# Patient Record
Sex: MALE | Race: WHITE
[De-identification: names, ages, dates, MRNs, and addresses within clinical notes are randomized per-mention and may not be internally consistent; named-entity substitution may affect disease eponyms.]

---

## 2019-02-23 ENCOUNTER — HOSPITAL ENCOUNTER (INPATIENT)
Dept: HOSPITAL 72 - EMR | Age: 59
LOS: 9 days | Discharge: LEFT BEFORE DISCHARGE | DRG: 689 | End: 2019-03-04
Payer: MEDICARE

## 2019-02-23 VITALS — DIASTOLIC BLOOD PRESSURE: 70 MMHG | SYSTOLIC BLOOD PRESSURE: 128 MMHG

## 2019-02-23 VITALS — HEIGHT: 72 IN | BODY MASS INDEX: 25.9 KG/M2 | WEIGHT: 191.25 LBS

## 2019-02-23 VITALS — DIASTOLIC BLOOD PRESSURE: 73 MMHG | SYSTOLIC BLOOD PRESSURE: 135 MMHG

## 2019-02-23 DIAGNOSIS — F29: ICD-10-CM

## 2019-02-23 DIAGNOSIS — E87.6: ICD-10-CM

## 2019-02-23 DIAGNOSIS — N39.0: Primary | ICD-10-CM

## 2019-02-23 DIAGNOSIS — G93.41: ICD-10-CM

## 2019-02-23 DIAGNOSIS — Z79.4: ICD-10-CM

## 2019-02-23 DIAGNOSIS — F20.0: ICD-10-CM

## 2019-02-23 DIAGNOSIS — I10: ICD-10-CM

## 2019-02-23 DIAGNOSIS — F32.9: ICD-10-CM

## 2019-02-23 DIAGNOSIS — E11.9: ICD-10-CM

## 2019-02-23 DIAGNOSIS — J44.9: ICD-10-CM

## 2019-02-23 LAB
ADD MANUAL DIFF: NO
ALBUMIN SERPL-MCNC: 3.3 G/DL (ref 3.4–5)
ALBUMIN/GLOB SERPL: 0.8 {RATIO} (ref 1–2.7)
ALP SERPL-CCNC: 127 U/L (ref 46–116)
ALT SERPL-CCNC: 22 U/L (ref 12–78)
ANION GAP SERPL CALC-SCNC: 8 MMOL/L (ref 5–15)
APPEARANCE UR: CLEAR
APTT PPP: YELLOW S
AST SERPL-CCNC: 23 U/L (ref 15–37)
BASOPHILS NFR BLD AUTO: 1.3 % (ref 0–2)
BILIRUB SERPL-MCNC: 0.6 MG/DL (ref 0.2–1)
BUN SERPL-MCNC: 7 MG/DL (ref 7–18)
CALCIUM SERPL-MCNC: 8.9 MG/DL (ref 8.5–10.1)
CHLORIDE SERPL-SCNC: 102 MMOL/L (ref 98–107)
CK MB SERPL-MCNC: 1.7 NG/ML (ref 0–3.6)
CK SERPL-CCNC: 276 U/L (ref 26–308)
CO2 SERPL-SCNC: 26 MMOL/L (ref 21–32)
CREAT SERPL-MCNC: 0.7 MG/DL (ref 0.55–1.3)
EOSINOPHIL NFR BLD AUTO: 1.1 % (ref 0–3)
ERYTHROCYTE [DISTWIDTH] IN BLOOD BY AUTOMATED COUNT: 11.8 % (ref 11.6–14.8)
GLOBULIN SER-MCNC: 3.9 G/DL
GLUCOSE UR STRIP-MCNC: NEGATIVE MG/DL
HCT VFR BLD CALC: 40 % (ref 42–52)
HGB BLD-MCNC: 13.7 G/DL (ref 14.2–18)
KETONES UR QL STRIP: NEGATIVE
LEUKOCYTE ESTERASE UR QL STRIP: (no result)
LYMPHOCYTES NFR BLD AUTO: 33.8 % (ref 20–45)
MCV RBC AUTO: 93 FL (ref 80–99)
MONOCYTES NFR BLD AUTO: 8.1 % (ref 1–10)
NEUTROPHILS NFR BLD AUTO: 55.8 % (ref 45–75)
NITRITE UR QL STRIP: NEGATIVE
PH UR STRIP: 7 [PH] (ref 4.5–8)
PLATELET # BLD: 306 K/UL (ref 150–450)
POTASSIUM SERPL-SCNC: 3.3 MMOL/L (ref 3.5–5.1)
PROT UR QL STRIP: (no result)
RBC # BLD AUTO: 4.3 M/UL (ref 4.7–6.1)
SODIUM SERPL-SCNC: 136 MMOL/L (ref 136–145)
SP GR UR STRIP: 1.01 (ref 1–1.03)
UROBILINOGEN UR-MCNC: 1 MG/DL (ref 0–1)
WBC # BLD AUTO: 9.7 K/UL (ref 4.8–10.8)

## 2019-02-23 PROCEDURE — 84484 ASSAY OF TROPONIN QUANT: CPT

## 2019-02-23 PROCEDURE — 80048 BASIC METABOLIC PNL TOTAL CA: CPT

## 2019-02-23 PROCEDURE — 87081 CULTURE SCREEN ONLY: CPT

## 2019-02-23 PROCEDURE — 99285 EMERGENCY DEPT VISIT HI MDM: CPT

## 2019-02-23 PROCEDURE — 82553 CREATINE MB FRACTION: CPT

## 2019-02-23 PROCEDURE — 82962 GLUCOSE BLOOD TEST: CPT

## 2019-02-23 PROCEDURE — 82550 ASSAY OF CK (CPK): CPT

## 2019-02-23 PROCEDURE — 83690 ASSAY OF LIPASE: CPT

## 2019-02-23 PROCEDURE — 96372 THER/PROPH/DIAG INJ SC/IM: CPT

## 2019-02-23 PROCEDURE — 81003 URINALYSIS AUTO W/O SCOPE: CPT

## 2019-02-23 PROCEDURE — 85025 COMPLETE CBC W/AUTO DIFF WBC: CPT

## 2019-02-23 PROCEDURE — 36415 COLL VENOUS BLD VENIPUNCTURE: CPT

## 2019-02-23 PROCEDURE — 96360 HYDRATION IV INFUSION INIT: CPT

## 2019-02-23 PROCEDURE — 93005 ELECTROCARDIOGRAM TRACING: CPT

## 2019-02-23 PROCEDURE — 80053 COMPREHEN METABOLIC PANEL: CPT

## 2019-02-23 PROCEDURE — 71045 X-RAY EXAM CHEST 1 VIEW: CPT

## 2019-02-23 PROCEDURE — 87086 URINE CULTURE/COLONY COUNT: CPT

## 2019-02-23 PROCEDURE — 70450 CT HEAD/BRAIN W/O DYE: CPT

## 2019-02-23 PROCEDURE — 83880 ASSAY OF NATRIURETIC PEPTIDE: CPT

## 2019-02-23 PROCEDURE — 70486 CT MAXILLOFACIAL W/O DYE: CPT

## 2019-02-23 PROCEDURE — 87181 SC STD AGAR DILUTION PER AGT: CPT

## 2019-02-23 RX ADMIN — HEPARIN SODIUM SCH UNITS: 5000 INJECTION INTRAVENOUS; SUBCUTANEOUS at 21:00

## 2019-02-23 RX ADMIN — INSULIN ASPART SCH UNITS: 100 INJECTION, SOLUTION INTRAVENOUS; SUBCUTANEOUS at 21:00

## 2019-02-23 NOTE — NUR
NURSE NOTES:

Left message to Dr. Younger regarding admission orders as asked to by Dr. Lakhani. Awaiting 
orders.

## 2019-02-23 NOTE — NUR
NURSE NOTES:

Patient not allowing to do physical assessment  ,will not answer questiions,  patient state 
leave me alone I just want to sleep Respirations unlabored. Waiting for admission 
orders.Call light within reach,bed alarm is on.

## 2019-02-23 NOTE — NUR
NURSE NOTES:

Notified Dr. Lakhani of admission and admission orders. Medications reconciled. Patient 
refusing vital signs, yelling at staff to "GET OUT, I WANT TO SLEEP". Two RNs attempted to 
take vitals and do assessment. Information for admission taken from SNF paperwork.

## 2019-02-23 NOTE — NUR
TRANSFER TO FLOOR:

Patient transferred to Med Surg as ordered, per Campos Lakhani.   Report given to 
Charge Nurse.

## 2019-02-23 NOTE — EMERGENCY ROOM REPORT
History of Present Illness


General


Chief Complaint:  Altered Level of Consciousness


Source:  Patient, EMS





Present Illness


HPI


Patient presents with complaints of change in mental status patient appears to 

be more confused than usual


Patient has history of underlying psychiatric conditions


Has obvious facial trauma





Upon initial discussion and history the patient is screaming at staff and myself


Telling us to back off and to not touch him





Patient has lack of decision-making capacity is not aware of his medical 

condition and is not reporting any facial trauma which she clearly has





History of present illness remains significantly limited


Allergies:  


Coded Allergies:  


     No Known Allergies (Unverified , 2/23/19)





Patient History


Limited by:  medical condition


Past Medical History:  see triage record


Pertinent Family History:  unable to obtain


Reviewed Nursing Documentation:  PMH: Agreed; PSxH: Agreed





Review of Systems


All Other Systems:  limited - Other than the ones mentioned in the history of 

present illness all others are reviewed however they do stay limited due to the 

patient's mental status





Physical Exam





Vital Signs








  Date Time  Temp Pulse Resp B/P (MAP) Pulse Ox O2 Delivery O2 Flow Rate FiO2


 


2/23/19 14:33 98.1 88 19 128/70 99 Room Air  








Sp02 EP Interpretation:  reviewed, normal


General Appearance:  no apparent distress


Head:  other - Ecchymosis around the left upper and lower orbital region, some 

edema bilaterally as well both eyelids


ENT:  normal pharynx, no angioedema


Neck:  supple, thyroid normal


Respiratory:  lungs clear, no retraction, no accessory muscle use


Cardiovascular #1:  regular rate, rhythm


Gastrointestinal:  non tender, soft


Musculoskeletal:  normal inspection, other - Patient does not follow commands 

specifically however does not show any evidence of focal deficit


Neurologic:  responsive, other - However patient does have history of 

psychiatric conditions appears to have acute psychosis


Skin:  normal color, no rash


Lymphatic:  no adenopathy





Medical Decision Making


Diagnostic Impression:  


 Primary Impression:  


 Encephalopathy


 Additional Impressions:  


 Schizophrenia


 Allergic reaction


ER Course


Patient is a fairly complex patient with multiple differential to consideration 

including but not limited to cardiac cardiopulmonary and vascular emergencies


Given the increased edema in both upper eyelids as well


Some consideration for reaction is noted





Patient's blood work otherwise appropriate CT imaging does not show any acute 

intracranial pathology


And patient stable for initial conservative inpatient trial





Labs








Test


  2/23/19


15:30 2/23/19


15:39


 


Urine Color Yellow  


 


Urine Appearance Clear  


 


Urine pH 7 (4.5-8.0)  


 


Urine Specific Gravity


  1.010


(1.005-1.035) 


 


 


Urine Protein 1+ (NEGATIVE)  


 


Urine Glucose (UA)


  Negative


(NEGATIVE) 


 


 


Urine Ketones


  Negative


(NEGATIVE) 


 


 


Urine Blood


  Negative


(NEGATIVE) 


 


 


Urine Nitrite


  Negative


(NEGATIVE) 


 


 


Urine Bilirubin


  Negative


(NEGATIVE) 


 


 


Urine Urobilinogen


  1 MG/DL


(0.0-1.0) 


 


 


Urine Leukocyte Esterase 1+ (NEGATIVE)  


 


Urine RBC


  2-4 /HPF (0 -


0) 


 


 


Urine WBC


  5-10 /HPF (0 -


0) 


 


 


Urine Squamous Epithelial


Cells None /LPF


(NONE/OCC) 


 


 


Urine Amorphous Sediment


  Few /LPF


(NONE) 


 


 


Urine Bacteria


  Moderate /HPF


(NONE) 


 


 


White Blood Count


  


  9.7 K/UL


(4.8-10.8)


 


Red Blood Count


  


  4.30 M/UL


(4.70-6.10)


 


Hemoglobin


  


  13.7 G/DL


(14.2-18.0)


 


Hematocrit


  


  40.0 %


(42.0-52.0)


 


Mean Corpuscular Volume  93 FL (80-99) 


 


Mean Corpuscular Hemoglobin


  


  31.8 PG


(27.0-31.0)


 


Mean Corpuscular Hemoglobin


Concent 


  34.2 G/DL


(32.0-36.0)


 


Red Cell Distribution Width


  


  11.8 %


(11.6-14.8)


 


Platelet Count


  


  306 K/UL


(150-450)


 


Mean Platelet Volume


  


  9.1 FL


(6.5-10.1)


 


Neutrophils (%) (Auto)


  


  55.8 %


(45.0-75.0)


 


Lymphocytes (%) (Auto)


  


  33.8 %


(20.0-45.0)


 


Monocytes (%) (Auto)


  


  8.1 %


(1.0-10.0)


 


Eosinophils (%) (Auto)


  


  1.1 %


(0.0-3.0)


 


Basophils (%) (Auto)


  


  1.3 %


(0.0-2.0)


 


Sodium Level


  


  136 MMOL/L


(136-145)


 


Potassium Level


  


  3.3 MMOL/L


(3.5-5.1)


 


Chloride Level


  


  102 MMOL/L


()


 


Carbon Dioxide Level


  


  26 MMOL/L


(21-32)


 


Anion Gap


  


  8 mmol/L


(5-15)


 


Blood Urea Nitrogen  7 mg/dL (7-18) 


 


Creatinine


  


  0.7 MG/DL


(0.55-1.30)


 


Estimat Glomerular Filtration


Rate 


  > 60 mL/min


(>60)


 


Glucose Level


  


  118 MG/DL


()


 


Calcium Level


  


  8.9 MG/DL


(8.5-10.1)


 


Total Bilirubin


  


  0.6 MG/DL


(0.2-1.0)


 


Aspartate Amino Transf


(AST/SGOT) 


  23 U/L (15-37) 


 


 


Alanine Aminotransferase


(ALT/SGPT) 


  22 U/L (12-78) 


 


 


Alkaline Phosphatase


  


  127 U/L


()


 


Total Creatine Kinase


  


  276 U/L


()


 


Creatine Kinase MB


  


  1.7 NG/ML


(0.0-3.6)


 


Creatine Kinase MB Relative


Index 


  0.6 


 


 


Troponin I


  


  0.007 ng/mL


(0.000-0.056)


 


Pro-B-Type Natriuretic Peptide


  


  135 pg/mL


(0-125)


 


Total Protein


  


  7.2 G/DL


(6.4-8.2)


 


Albumin


  


  3.3 G/DL


(3.4-5.0)


 


Globulin  3.9 g/dL 


 


Albumin/Globulin Ratio  0.8 (1.0-2.7) 


 


Lipase


  


  64 U/L


()








Rhythm Strip Diag. Results


EP Interpretation:  yes


Rate:  60


Rhythm:  NSR, no PVC's, no ectopy





Chest X-Ray Diagnostic Results


Chest X-Ray Diagnostic Results :  


   Chest X-Ray Ordered:  Yes


   # of Views/Limited/Complete:  1 View


   Indication:  Chest Pain


   EP Interpretation:  Yes


   Interpretation:  no consolidation, no pneumothorax, other - Poor respiratory 

efforts mild atelectasis bilaterally,


   Impression:  No acute disease


   Electronically Signed by:  Abril Reagan DO





CT/MRI/US Diagnostic Results


CT/MRI/US Diagnostic Results :  


   Impression


CT head1.  No acute intracranial findings.


2.  Soft tissue swelling in the frontal scalp with possible subgaleal 


hematoma.


3.  Partial opacification of the left mastoid air cells, suggesting left 


mastoid effusion.








CT facialIMPRESSION:     


  Partial fluid opacification of the left mastoid air cells, suggesting 


the left mastoid effusion.





Last Vital Signs








  Date Time  Temp Pulse Resp B/P (MAP) Pulse Ox O2 Delivery O2 Flow Rate FiO2


 


2/23/19 14:33 98.1 88 19 128/70 99 Room Air  








Status:  improved


Disposition:  ADMITTED AS INPATIENT


Condition:  Abril Obando DO Feb 23, 2019 15:07

## 2019-02-23 NOTE — DIAGNOSTIC IMAGING REPORT
EXAM:

  CT Maxillofacial Without Intravenous Contrast

 

CLINICAL HISTORY:

  Altered mental status

 

TECHNIQUE:

  Axial computed tomography images of the face without intravenous 

contrast.  CTDI is 28.19 mGy and DLP is 621.62 mGy-cm.  One or more of 

the following dose reduction techniques were used: automated exposure 

control, adjustment of the mA and/or kV according to patient size, use of 

iterative reconstruction technique.

  Coronal and sagittal reformatted images were created and reviewed.

 

COMPARISON:

  Noncontrast CT of the head obtained the same date

 

FINDINGS:

  Bones/joints:  No visible displaced fracture in the facial bones, nasal 

bones, maxilla or mandible.  Nasal septum is midline.

  Soft tissues:  Unremarkable.

  Orbits:  Unremarkable. Bony orbits appear intact.  Bilateral globes and 

intraconal soft tissues appear unremarkable.

  Sinuses:  Unremarkable.  No air-fluid levels.

  Mastoid air cells:  Partial fluid opacification of the left mastoid air 

cells, suggesting the left mastoid effusion.

 

IMPRESSION:     

  Partial fluid opacification of the left mastoid air cells, suggesting 

the left mastoid effusion.

## 2019-02-23 NOTE — DIAGNOSTIC IMAGING REPORT
EXAM:

  XR Chest, 1 View

 

CLINICAL HISTORY:

  Chest pain

 

TECHNIQUE:

  Frontal view of the chest.

 

COMPARISON:

  No relevant prior studies available.

 

FINDINGS:

  Lungs:  Low lung volumes, which may be related to shallow inspiration.  

Sub-segmental atelectasis in the lung bases, left greater than right.  

Mildly increased interstitial markings.  Oblique linear marking overlying 

the right upper lung, mediastinum, left lower lung, and left upper 

abdominal quadrant, likely represents a skinfold.

  Pleural space:  Unremarkable.  The costophrenic angles are sharp.  No 

visible pneumothorax.

  Heart:  Unremarkable.  No cardiomegaly.

  Mediastinum: Unremarkable.

  Bones/joints:  Mild degenerative changes at the visualized spine.

  Tubes, lines and devices:  EKG patch overlies the left upper thorax.

 

IMPRESSION:     

1.  Low lung volumes, which may be related to shallow inspiration.

2.  Sub-segmental atelectasis in the lung bases, left greater than right.

3.  Mildly increased interstitial markings.  This is likely related to 

bronchovascular crowding from the low lung volumes.  .

## 2019-02-23 NOTE — NUR
ED Nurse Note:

Patient kathy from [facility] c/o ALOC, EMs states that the patient's last 
known well time was yesterday. at time of arrival patient is awake, denies any 
pain. patient does have a bump on the right side of his face, when asked how 
that happened, patient stated that he fe fell off the bed. no lacerations noted

## 2019-02-23 NOTE — NUR
NURSE NOTES:

Pt is received from [facility]. Pt is in bed asleep. No acute distress noted. Pt refuses to 
engage with the nurse. Pt refuses physical assessment and vital sign check. Pt appears to 
have bruising and swelling around the right eye, scan was taken in the ER. Pt is resistive 
to care. Pt states, " leave me alone". Reorientation provided. Fall precaution implemented. 
Bed low in position, side rails up and call light with reach. Pt will be monitored.

## 2019-02-24 VITALS — SYSTOLIC BLOOD PRESSURE: 114 MMHG | DIASTOLIC BLOOD PRESSURE: 57 MMHG

## 2019-02-24 RX ADMIN — SERTRALINE HYDROCHLORIDE SCH MG: 100 TABLET, FILM COATED ORAL at 09:00

## 2019-02-24 RX ADMIN — HEPARIN SODIUM SCH UNITS: 5000 INJECTION INTRAVENOUS; SUBCUTANEOUS at 09:00

## 2019-02-24 RX ADMIN — METOPROLOL TARTRATE SCH MG: 25 TABLET, FILM COATED ORAL at 09:00

## 2019-02-24 RX ADMIN — INSULIN ASPART SCH UNITS: 100 INJECTION, SOLUTION INTRAVENOUS; SUBCUTANEOUS at 11:30

## 2019-02-24 RX ADMIN — INSULIN ASPART SCH UNITS: 100 INJECTION, SOLUTION INTRAVENOUS; SUBCUTANEOUS at 16:30

## 2019-02-24 RX ADMIN — INSULIN ASPART SCH UNITS: 100 INJECTION, SOLUTION INTRAVENOUS; SUBCUTANEOUS at 21:00

## 2019-02-24 RX ADMIN — HEPARIN SODIUM SCH UNITS: 5000 INJECTION INTRAVENOUS; SUBCUTANEOUS at 21:00

## 2019-02-24 RX ADMIN — INSULIN ASPART SCH UNITS: 100 INJECTION, SOLUTION INTRAVENOUS; SUBCUTANEOUS at 06:30

## 2019-02-24 NOTE — NUR
NURSE NOTES:

Pt is in bed, asleep. Pt does not  like to interact with nursing staff. Pt is not eating or 
drinking anything. Pt likes to be left alone. Dr. cui was notified by previous shift RN. 
Dr. Madrigal came to see patient per RN. Pt is on Fall Precaution. Pt will be monitored. Bed 
alarm on. Bed low in position,side rails up and call light within reach. 

-------------------------------------------------------------------------------

Addendum: 02/24/19 at 1931 by EVE COATS RN RN

-------------------------------------------------------------------------------

Disregard above note; wrong time.

## 2019-02-24 NOTE — CONSULTATION
History of Present Illness


General


Date patient seen:  Feb 24, 2019


Time patient seen:  08:30


Chief Complaint:  Altered Level of Consciousness


Referring physician:  dr Lakhani


Reason for Consultation:  inpatient manageemnt, acute encephalopathy, hx of





Present Illness


HPI


58 years old male with PMH of HTN, DM, COPD,   major depressive disorder ,  

schizophrenia , anxiety disorder , presented to emergency department for being 

more confused than usual.  





Upon evaluation  vital signs were stable.


Laboratory workup revealed no leukocytosis, stable hemoglobin and hematocrit, 

stable electrolytes, except potassium 3.3.


Glucose 118.


Stable renal parameters.


Troponin negative, pro . EKG revealed normal sinus rhythm, no acute 

ischemic changes.


Albumin 3.3.


Lipase 64.


Chest x-ray revealed no acute cardiopulmonary pathology, but showed bilateral 

mild atelectasis.


CT of the head revealed no acute intracranial pathology.  Noted soft tissue 

swelling in the frontal scalp with possible subgaleal hematoma.  Partial 

opacification of the left mastoid air cells suggesting left mastoid effusion.


In emergency department patient received empiric antibiotic and was given 1 

dose of Solu-Medrol for possible allergic reaction along with the Benadryl.


Patient received bolus of fluid,  Haldol and was  admitted to medical surgical 

floor for further management.


Allergies:  


Coded Allergies:  


     No Known Allergies (Unverified , 2/23/19)





Medication History


Scheduled


Ciprofloxacin (Ciprofloxacin HCl), 1 DROP BOTH EYES QID, (Reported)


Ferrous Sulfate* (Ferrous Sulfate*), 325 MG ORAL DAILY, (Reported)


Hydrochlorothiazide* (Hydrochlorothiazide*), 12.5 MG ORAL DAILY, (Reported)


Metoprolol Tartrate* (Metoprolol Tartrate*), 25 MG ORAL DAILY, (Reported)


Multivitamin With Minerals (Multivitamins With Minerals*), 1 TAB ORAL DAILY, (

Reported)


Quetiapine Fumarate* (Seroquel*), 100 MG ORAL TWICE A DAY, (Reported)


Sertraline Hcl* (Zoloft*), 100 MG ORAL DAILY, (Reported)





Scheduled PRN


Acetaminophen* (Acetaminophen 325MG Tablet*), 650 MG ORAL Q4H PRN for Mild Pain/

Temp > 100.5, (Reported)


Lorazepam* (Ativan*), 1 MG ORAL Q12HR PRN for For Anxiety, (Reported)


Magnesium Hydroxide (Milk of Magnesia), 30 ML ORAL EVERY 4 HOURS PRN for 

HEARTBURN/GASTRITIS, (Reported)





Miscellaneous Medications


Insulin Human Lispro (Humalog), 0 SUBQ, (Reported)





Discontinued Medications


Acetaminophen* (Acetaminophen 325MG Tablet*), 325 MG ORAL Q4H PRN for Mild Pain/

Temp > 100.5, (Reported)


   Discontinued Reason: Medication dose changed


Insulin Lispro (Humalog), 0 SUBQ, (Reported)


   Discontinued Reason: Medication dose changed


Lorazepam* (Ativan*), 1 MG ORAL BEDTIME PRN for RECURRENT FEELINGS OF PANIC, (

Reported)


   Discontinued Reason: Medication dose changed


Sertraline Hcl* (Zoloft*), 100 MG ORAL BID, (Reported)


   Discontinued Reason: Medication dose changed





Patient History


History Provided By:  Medical Record


Healthcare decision maker


N


Resuscitation status


Full Code


Advanced Directive on File








Past Medical/Surgical History


Past Medical/Surgical History:  


(1) Schizophrenia


(2) Anxiety





Review of Systems


ROS Narrative


not available, patient is unable to provide it





Physical Exam


General Appearance:  no apparent distress - awake, alert, depressed with flat 

affect male


Lines, tubes and drains:  peripheral


HEENT:  normocephalic, atraumatic, anicteric, mucous membranes moist, other - L 

eyelid ecchymosis and edema


Neck:  non-tender, supple


Respiratory/Chest:  lungs clear, no accessory muscle use


Cardiovascular/Chest:  normal rate, no JVD


Abdomen:  normal bowel sounds, non tender, soft


Extremities:  normal range of motion, no calf tenderness


Neurologic:  alert, responsive - flat affect , other - no gross focal


Musculoskeletal:  normal muscle bulk





Last 24 Hour Vital Signs








  Date Time  Temp Pulse Resp B/P (MAP) Pulse Ox O2 Delivery O2 Flow Rate FiO2


 


2/23/19 21:00      Room Air  


 


2/23/19 17:20 98.1 85 22 128/70 99 Room Air  


 


2/23/19 16:21 98.1 85 22 135/73 99 Room Air  


 


2/23/19 14:50 98.1 85 19 128/70 99 Room Air  


 


2/23/19 14:50  88 19   Room Air  


 


2/23/19 14:33 98.1 88 19 128/70 99 Room Air  

















Intake and Output  


 


 2/23/19 2/24/19





 18:59 06:59


 


Intake Total 0 ml 


 


Balance 0 ml 


 


  


 


Intake Oral 0 ml 











Laboratory Tests








Test


  2/23/19


15:30 2/23/19


15:39


 


Urine Color Yellow   


 


Urine Appearance Clear   


 


Urine pH 7 (4.5-8.0)   


 


Urine Specific Gravity


  1.010


(1.005-1.035) 


 


 


Urine Protein


  1+ (NEGATIVE)


H 


 


 


Urine Glucose (UA)


  Negative


(NEGATIVE) 


 


 


Urine Ketones


  Negative


(NEGATIVE) 


 


 


Urine Blood


  Negative


(NEGATIVE) 


 


 


Urine Nitrite


  Negative


(NEGATIVE) 


 


 


Urine Bilirubin


  Negative


(NEGATIVE) 


 


 


Urine Urobilinogen


  1 MG/DL


(0.0-1.0)  H 


 


 


Urine Leukocyte Esterase


  1+ (NEGATIVE)


H 


 


 


Urine RBC


  2-4 /HPF (0 -


0)  H 


 


 


Urine WBC


  5-10 /HPF (0 -


0)  H 


 


 


Urine Squamous Epithelial


Cells None /LPF


(NONE/OCC) 


 


 


Urine Amorphous Sediment


  Few /LPF


(NONE)  H 


 


 


Urine Bacteria


  Moderate /HPF


(NONE)  H 


 


 


White Blood Count


  


  9.7 K/UL


(4.8-10.8)


 


Red Blood Count


  


  4.30 M/UL


(4.70-6.10)  L


 


Hemoglobin


  


  13.7 G/DL


(14.2-18.0)  L


 


Hematocrit


  


  40.0 %


(42.0-52.0)  L


 


Mean Corpuscular Volume  93 FL (80-99)  


 


Mean Corpuscular Hemoglobin


  


  31.8 PG


(27.0-31.0)  H


 


Mean Corpuscular Hemoglobin


Concent 


  34.2 G/DL


(32.0-36.0)


 


Red Cell Distribution Width


  


  11.8 %


(11.6-14.8)


 


Platelet Count


  


  306 K/UL


(150-450)


 


Mean Platelet Volume


  


  9.1 FL


(6.5-10.1)


 


Neutrophils (%) (Auto)


  


  55.8 %


(45.0-75.0)


 


Lymphocytes (%) (Auto)


  


  33.8 %


(20.0-45.0)


 


Monocytes (%) (Auto)


  


  8.1 %


(1.0-10.0)


 


Eosinophils (%) (Auto)


  


  1.1 %


(0.0-3.0)


 


Basophils (%) (Auto)


  


  1.3 %


(0.0-2.0)


 


Sodium Level


  


  136 MMOL/L


(136-145)


 


Potassium Level


  


  3.3 MMOL/L


(3.5-5.1)  L


 


Chloride Level


  


  102 MMOL/L


()


 


Carbon Dioxide Level


  


  26 MMOL/L


(21-32)


 


Anion Gap


  


  8 mmol/L


(5-15)


 


Blood Urea Nitrogen


  


  7 mg/dL (7-18)


 


 


Creatinine


  


  0.7 MG/DL


(0.55-1.30)


 


Estimat Glomerular Filtration


Rate 


  > 60 mL/min


(>60)


 


Glucose Level


  


  118 MG/DL


()  H


 


Calcium Level


  


  8.9 MG/DL


(8.5-10.1)


 


Total Bilirubin


  


  0.6 MG/DL


(0.2-1.0)


 


Aspartate Amino Transf


(AST/SGOT) 


  23 U/L (15-37)


 


 


Alanine Aminotransferase


(ALT/SGPT) 


  22 U/L (12-78)


 


 


Alkaline Phosphatase


  


  127 U/L


()  H


 


Total Creatine Kinase


  


  276 U/L


()


 


Creatine Kinase MB


  


  1.7 NG/ML


(0.0-3.6)


 


Creatine Kinase MB Relative


Index 


  0.6  


 


 


Troponin I


  


  0.007 ng/mL


(0.000-0.056)


 


Pro-B-Type Natriuretic Peptide


  


  135 pg/mL


(0-125)  H


 


Total Protein


  


  7.2 G/DL


(6.4-8.2)


 


Albumin


  


  3.3 G/DL


(3.4-5.0)  L


 


Globulin  3.9 g/dL  


 


Albumin/Globulin Ratio


  


  0.8 (1.0-2.7)


L


 


Lipase


  


  64 U/L


()  L











Microbiology








 Date/Time


Source Procedure


Growth Status


 


 


 2/23/19 15:30


Urine,Clean Catch Urine Culture - Preliminary Resulted








Height (Feet):  6


Height (Inches):  0.00


Weight (Pounds):  192


Medications





Current Medications








 Medications


  (Trade)  Dose


 Ordered  Sig/Petr


 Route


 PRN Reason  Start Time


 Stop Time Status Last Admin


Dose Admin


 


 Acetaminophen


  (Tylenol)  650 mg  Q4H  PRN


 ORAL


 fever  2/23/19 19:00


 3/25/19 18:59   


 


 


 Al Hydroxide/Mg


 Hydroxide


  (Mylanta II)  30 ml  Q6H  PRN


 ORAL


 dyspepsia  2/23/19 19:00


 3/25/19 18:59   


 


 


 Dextrose


  (Dextrose 50%)  25 ml  Q30M  PRN


 IV


 Hypoglycemia  2/23/19 19:00


 3/25/19 18:59   


 


 


 Dextrose


  (Dextrose 50%)  50 ml  Q30M  PRN


 IV


 Hypoglycemia  2/23/19 19:00


 3/25/19 18:59   


 


 


 Ferrous Sulfate


  (Feosol)  325 mg  DAILY


 ORAL


   2/24/19 09:00


 3/26/19 08:59   


 


 


 Heparin Sodium


  (Porcine)


  (Heparin 5000


 units/ml)  5,000 units  EVERY 12  HOURS


 SUBQ


   2/23/19 21:00


 3/25/19 20:59   


 


 


 Insulin Aspart


  (NovoLOG)    BEFORE MEALS AND  HS


 SUBQ


   2/23/19 21:00


 3/25/19 20:59   


 


 


 Lorazepam


  (Ativan 2mg/ml


 1ml)  0.5 mg  Q4H  PRN


 IV


 For Anxiety  2/23/19 19:00


 3/2/19 18:59   


 


 


 Lorazepam


  (Ativan 2mg/ml


 1ml)  2 mg  Q4H  PRN


 IV


 For Anxiety  2/23/19 19:00


 3/2/19 18:59   


 


 


 Metoprolol


 Tartrate


  (Lopressor)  25 mg  DAILY


 ORAL


   2/24/19 09:00


 3/26/19 08:59   


 


 


 Morphine Sulfate


  (Morphine


 Sulfate)  1 mg  Q4H  PRN


 IVP


 For Pain  2/23/19 19:00


 3/2/19 18:59   


 


 


 Ondansetron HCl


  (Zofran)  4 mg  Q6H  PRN


 IVP


 Nausea & Vomiting  2/23/19 19:00


 3/25/19 18:59   


 


 


 Polyethylene


 Glycol


  (Miralax)  17 gm  HSPRN  PRN


 ORAL


 Constipation  2/23/19 19:00


 3/25/19 18:59   


 


 


 Quetiapine


 Fumarate


  (SEROquel)  100 mg  Q12HR


 ORAL


   2/23/19 21:00


 3/25/19 20:59   


 


 


 Sertraline HCl


  (Zoloft)  100 mg  DAILY


 ORAL


   2/24/19 09:00


 3/26/19 08:59   


 


 


 Zolpidem Tartrate


  (Ambien)  5 mg  HSPRN  PRN


 ORAL


 Insomnia  2/23/19 19:00


 3/2/19 18:59   


 











Assessment/Plan


Assessment/Plan


ASSESSMENT


acute encephalopathy


probably UTI  


COPD 


HTN 


DM 


Hypokalemia  


Major depressive disorder   





PLAN OF CARE 


MS floor 


empiric abx, fup with urine cx  


ID consult


s/p IVF, monitor renal parameters,  lytes, correct lytes as needed


O2,  HHN prn 


CXR stable 


doubt allergic reaction 





CT head no acute IC pathology, 


psych eval pending


probably exacerbation of psych problems 


BP management  with BB 


BS management with SSI


resume current psych meds for now with further optimization per psych MD when 

see the pt 


supportive care


DVT prophylaxis  


bowel regimen   





case discussed and evaluated by supervising physician











Paulina Everett NP Feb 24, 2019 09:55

## 2019-02-24 NOTE — NUR
NURSE NOTES:

 Patient responds to name,still does not want to eat breakfast or take po medication as 
ordered. After several attempts to get patient to cooperate,patient state will you leave me 
alone.

## 2019-02-24 NOTE — NUR
CASE MANAGEMENT: REVIEW



58/M BIBA FROM [facility] 



CC: AMS





SI: ENCEPHALOPATHY . ALOC

T 98.1 HR 88 RR 19 /70 SAT 99% ROOM AIR

H/H 13.7/40.0 K 3.3 

UA: LEUKOCYTE 1+ WBC 5-10 BACTERIA MODERATE





IS: NS IVF BOLUS X1

HALDOL IM X1

BENADRYL IV X1

SOLU MEDROL IV X1

CEFTRIAXONE IV X1







***PATIENT ADMITTED TO MED/SURG UNIT 02/23/2019***

DCP: PATIENT IS FROM [facility]

## 2019-02-24 NOTE — NUR
NURSE NOTES:

 Patient continues to be resistive to care,refusing vital sign,patient  state I don't 
want,leave me alone.. Offer patient urinal ,patient   state   I dont't  have to go..Will 
continue to monitor.

## 2019-02-24 NOTE — NUR
NURSE NOTES:

Pt continues to resist care. Pt is sleeping in bed. Pt refuses blood sugar check, food, 
vital signs check. Pt just wants to be left alone. Pt has been quite so far. Pt is on fall 
risk precaution. Pt will be monitored.

## 2019-02-24 NOTE — NUR
NURSE NOTES:

DR cui notified and given update regarding patient refusal of care and patient will not 
eat or drink.patient just wants to be left alone  patient is quiet and will only get 
agitated  if we attempt care otherwise patient is quiet and calm.DR Cui state to notify DR Madrigal and order to tranfer patient to [facility] prn,will notify  DR Madrigal .

## 2019-02-24 NOTE — NUR
NURSE NOTES:

Pt is in bed, asleep. Pt does not  like to interact with nursing staff. Pt is not eating or 
drinking anything. Pt likes to be left alone. Dr. cui was notified by previous shift RN. 
Dr. Madrigal came to see patient per RN. Pt is on Fall Precaution. Pt will be monitored. Bed 
alarm on. Bed low in position,side rails up and call light within reach.

## 2019-02-24 NOTE — NUR
NURSE NOTES:

 Patient responds to name,breakfast at bed side,patient state he does not want to eat at 
this time.respirations unlabored.Call light within reach,bed alarm is on.

## 2019-02-24 NOTE — NUR
NURSE NOTES:

 Patient ambulated to the bathroom ,gait is steady,patient back to bed,continue to refuse 
care.

## 2019-02-24 NOTE — NUR
NURSE NOTES:patient still resistant to care refusing routine medication as ordered,still 
does not want to eat breakfast.

## 2019-02-24 NOTE — NUR
NURSE NOTES: 

Patient responds when name called,patient ate a small amount of his dinner.Resting,at this 
time,call light within reach,bed alarm is on.

## 2019-02-24 NOTE — HISTORY AND PHYSICAL REPORT
DATE OF ADMISSION:  02/23/2019

TIME SEEN:  On 02/24/2019 at approx time 7 a.m.



CONSULTANTS:

1. Mili Madrigal M.D.

2. Jimmy Osborn M.D.

3. Tao Younger M.D.



CHIEF COMPLAINT:  Confusion, psychosis, possible allergic reaction, and

left eye ecchymosis.



BRIEF HISTORY:  This is a 58-year-old male from [facility],

presented with the above-mentioned diagnoses, admitted to medical floor

for further treatment.  Currently, sleeping in bed, refusing to answer

questions.



REVIEW OF SYSTEMS:  Unavailable.



PAST MEDICAL HISTORY:  Includes schizophrenia, altered mental status,

encephalopathy, and anxiety.



PAST SURGICAL HISTORY:  Unknown.



ALLERGIES:  Denies.



SOCIAL HISTORY:  Unable to obtain secondary to the patient's condition.



OBJECTIVE:

GENERAL:  Slightly confused in bed, sleepy, refusing to answer questions.

VITAL SIGNS:  Temperature is 98 degrees, pulse 85, respiratory rate 22, and

blood pressure 120/70.

CARDIOVASCULAR:  No murmur.

LUNGS:  Poor exchange.

ABDOMEN:  Bowel sounds positive.

EXTREMITIES:  Show no cyanosis or edema.

HEENT:  Left periorbital ecchymosis noted.

NEUROLOGIC:  The patient moves all extremities, slightly weak.



LABORATORY DATA:  Hemoglobin 13.7.  Otherwise, CBC is normal.  BMP shows

potassium 3.3 and glucose 118.  Troponin 0.007.  BNP is 135, otherwise all

normal.  Urinalysis, 1+ leukocyte esterase.



MEDICATIONS:  Include ferrous sulfate, metoprolol, sertraline, quetiapine,

insulin, Tylenol, morphine, _____ hydroxide, ceftriaxone,

methylprednisone, diphenhydramine, and Haldol.



ASSESSMENT:

1. Psychosis.

2. Urinary tract infection.

3. Left eye ecchymosis.

4. Allergic reaction.



PLAN:

1. Antibiotic per Infectious Disease.

2. _____ and dietary followup.

3. Resume home medications.

4. CBC and BMP in the morning.









  ______________________________________________

  Campos Lakhani D.O.





DR:  UBALDO

D:  02/24/2019 07:41

T:  02/24/2019 08:12

JOB#:  [id number]

CC:

## 2019-02-24 NOTE — NUR
NURSE NOTES:



 Patient continues to refuse care and po intake Call light within reach,bed alarm is on. 
,medication given per DR Madrigal  order.Will continue to monitor.

## 2019-02-24 NOTE — NUR
CHARGE NURSE NOTES:

Dr. Madrigal made aware of consult order. Made aware patient refusing meds and IV. Per MD, 
will see patient in AM. Encouraged PO intake. continues to refuse. will monitor patient

## 2019-02-25 VITALS — DIASTOLIC BLOOD PRESSURE: 67 MMHG | SYSTOLIC BLOOD PRESSURE: 127 MMHG

## 2019-02-25 RX ADMIN — INSULIN ASPART SCH UNITS: 100 INJECTION, SOLUTION INTRAVENOUS; SUBCUTANEOUS at 16:30

## 2019-02-25 RX ADMIN — HEPARIN SODIUM SCH UNITS: 5000 INJECTION INTRAVENOUS; SUBCUTANEOUS at 09:00

## 2019-02-25 RX ADMIN — INSULIN ASPART SCH UNITS: 100 INJECTION, SOLUTION INTRAVENOUS; SUBCUTANEOUS at 06:30

## 2019-02-25 RX ADMIN — INSULIN ASPART SCH UNITS: 100 INJECTION, SOLUTION INTRAVENOUS; SUBCUTANEOUS at 11:30

## 2019-02-25 RX ADMIN — SERTRALINE HYDROCHLORIDE SCH MG: 100 TABLET, FILM COATED ORAL at 09:00

## 2019-02-25 RX ADMIN — METOPROLOL TARTRATE SCH MG: 25 TABLET, FILM COATED ORAL at 09:00

## 2019-02-25 RX ADMIN — HEPARIN SODIUM SCH UNITS: 5000 INJECTION INTRAVENOUS; SUBCUTANEOUS at 20:49

## 2019-02-25 RX ADMIN — INSULIN ASPART SCH UNITS: 100 INJECTION, SOLUTION INTRAVENOUS; SUBCUTANEOUS at 20:49

## 2019-02-25 NOTE — NUR
Social Service Note



SW confirmed Michelle Hollis [phone] is the assigned public guardian assigned to 
patient's care.  Patient is LPS conserved.  Patient was only at [facility] 3 days prior 
to transfer.  Per Michelle patient has been in multiple SNF's and requires 24 nursing care. 
 Michelle is in agreement with inpt psych transfer to any appropriate psych facility if 
required.   Will continue to monitor.

## 2019-02-25 NOTE — CONSULTATION
DATE OF CONSULTATION:  02/25/2019

NOTE:  POOR AUDIO



CONSULTING PHYSICIAN:  Mili Madrigal M.D.



HISTORY OF PRESENT ILLNESS:  The patient is a 58-year-old male patient with

encephalopathy, but this patient continues to have some agitation,

irritability, and mood lability worsened by stress of his medical illness

_____ confusion and disorganized thought process.  He has an allergic

reaction apparently as well, but the patient has overlying diagnosis of

paranoid schizophrenia with acute exacerbation.  This is why, _____ daily

psychiatric consultation.  He has been refusing treatments and medications

on the unit, very confused, has no insight.



PAST MEDICAL HISTORY:  Medical problems include _____ infection and

encephalopathy.



ALLERGIES:  No known drug allergies.



PSYCHOTROPIC MEDICATIONS ON ADMISSION:  Seroquel 100 twice a day, Zoloft 50

mg daily.



SUBSTANCE ABUSE HISTORY:  Denies.



PAIN ASSESSMENT:  0/10.



DEVELOPMENTAL PROBLEMS:  Denies.



SOCIAL HISTORY:  This patient is currently living in [facility].  Financially supported by [facility] and Medicare.



PSYCHIATRIC HISTORY:  Paranoid schizophrenia, multiple psychiatric

admissions.



STRENGTHS:  He is motivated to get better.  He is healthy.



WEAKNESSES:  Impulsive with minimal support system.



MENTAL STATUS EXAMINATION:  A 58-year-old male.  Appearance is disheveled.

Attitude, irritable and agitated.  Affect, guarded and restricted.

Intellect poor.  Mood, depressed and anxious.  Motor activity, psychomotor

agitation.  Attention span is poor.  Orientation x2.  Speech is low volume

and slurred.  Thought process, disorganized and illogical.  Insight and

judgment is poor.



DIAGNOSES:

1. Paranoid schizophrenia with acute exacerbation.

2. Medical _____.

3. Psychosocial stressors, financial.  Functional impairment is

severe.



PLAN:  I am going to treat him with Seroquel 100 twice a day, Zoloft 50 mg

a day, but also Haldol Decanoate 100 mg IM every month.  He has history of

noncompliance.  Also, 20 minutes of cognitive behavioral therapy provided

to help him identify his automatic negative thoughts and to help him to

convert those negative thoughts to more positive thoughts to reduce

depression, anxiety, and suicidality.  Twenty minutes of cognitive

behavioral therapy provided.  Chart reviewed and discussed with staff.

Seen and assessed at the bedside.









  ______________________________________________

  Mili Madrigal M.D.





DR:  Charly

D:  02/25/2019 04:44

T:  02/25/2019 23:39

JOB#:  [id number]

CC:

## 2019-02-25 NOTE — NUR
NURSE NOTES:

Dr EAGLE visited pt, he is aware pt refused all medications, V/S,CHECK BS, called Dr ARTIS, left massage. will continue to monitor.

## 2019-02-25 NOTE — NUR
NURSE NOTES:

patient received. patient in no acute distress at this time. patient complains of no pain at 
this time. patient awake and alert x1. IV intact and asymptomatic. Urinal at bedside. 
patient refusing all medications and procedures. Dr. Madrigal was called i left a message. 
will continue to monitor.

## 2019-02-25 NOTE — GENERAL PROGRESS NOTE
Assessment/Plan


Problem List:  


(1) UTI (urinary tract infection)


ICD Codes:  N39.0 - Urinary tract infection, site not specified


SNOMED:  38995649


(2) Altered mental status


ICD Codes:  R41.82 - Altered mental status, unspecified


SNOMED:  124164235


(3) Allergic reaction


ICD Codes:  T78.40XA - Allergy, unspecified, initial encounter


SNOMED:  289138789


(4) Anxiety


ICD Codes:  F41.9 - Anxiety disorder, unspecified


SNOMED:  11010198


(5) Schizophrenia


ICD Codes:  F20.9 - Schizophrenia, unspecified


SNOMED:  06377258


(6) Encephalopathy


ICD Codes:  G93.40 - Encephalopathy, unspecified


SNOMED:  94139450


Status:  unchanged


Assessment/Plan


pt diet abx cbc bmp am psyc transfer





Subjective


Constitutional:  Reports: weakness


Allergies:  


Coded Allergies:  


     No Known Allergies (Unverified , 2/23/19)


All Systems:  reviewed and negative except above


Subjective


calm in room confused





Objective





Last 24 Hour Vital Signs








  Date Time  Temp Pulse Resp B/P (MAP) Pulse Ox O2 Delivery O2 Flow Rate FiO2


 


2/25/19 09:00      Room Air  


 


2/24/19 21:00      Room Air  


 


2/24/19 20:00  87 18 114/57 (76) 97   

















Intake and Output  


 


 2/24/19 2/25/19





 19:00 07:00


 


  


 


# Voids  1








Height (Feet):  6


Height (Inches):  0.00


Weight (Pounds):  192


General Appearance:  lethargic, confused


EENT:  normal ENT inspection


Neck:  normal alignment


Cardiovascular:  normal peripheral pulses, normal rate, regular rhythm


Respiratory/Chest:  chest wall non-tender, lungs clear, normal breath sounds


Abdomen:  normal bowel sounds, non tender, soft


Extremities:  normal inspection


Edema:  no edema noted Arm (L), no edema noted Arm (R), no edema noted Leg (L), 

no edema noted Leg (R), no edema noted Pedal (L), no edema noted Pedal (R), no 

edema noted Generalized


Neurologic:  motor weakness


Skin:  normal pigmentation, warm/dry











KarCamposabeba TrujilloWindy  Feb 25, 2019 13:46

## 2019-02-25 NOTE — PULMONOLOGY PROGRESS NOTE
Assessment/Plan


Problems:  


(1) Altered mental status


(2) Schizophrenia


(3) Anxiety


(4) Altered level of consciousness


Assessment/Plan


symptomatic treatment


check electrolytes


fu psych recommendations


dvt prophylaxis





Subjective


ROS Limited/Unobtainable:  No


Allergies:  


Coded Allergies:  


     No Known Allergies (Unverified , 2/23/19)





Objective





Last 24 Hour Vital Signs








  Date Time  Temp Pulse Resp B/P (MAP) Pulse Ox O2 Delivery O2 Flow Rate FiO2


 


2/25/19 09:00      Room Air  


 


2/24/19 21:00      Room Air  


 


2/24/19 20:00  87 18 114/57 (76) 97   

















Intake and Output  


 


 2/24/19 2/25/19





 18:59 06:59


 


  


 


# Voids  1








Objective


General Appearance:  WD/WN


HEENT:  normocephalic


Respiratory/Chest:  chest wall non-tender, lungs clear, normal breath sounds


Breasts:  no masses


Cardiovascular:  normal peripheral pulses, normal rate


Abdomen:  normal bowel sounds, soft, non tender


Extremities:  no cyanosis


Skin:  no rash





Microbiology








 Date/Time


Source Procedure


Growth Status


 


 


 2/23/19 16:05


Nasal Nares MRSA Culture - Final


NO METHICILLIN RESISTANT STAPH AUREUS... Complete


 


 2/23/19 15:30


Urine,Clean Catch Urine Culture - Preliminary


Staphylococcus Sp Coag Neg Resulted





 2/23/19 16:05


Rectum VRE Culture - Final


NO VANCOMYCIN RESISTANT ENTEROCOCCUS ... Complete


 


 2/23/19 16:05


Rectum - Final


NO CARBAPENEM-RESISTANT ENTEROBACTERI... Complete











Current Medications








 Medications


  (Trade)  Dose


 Ordered  Sig/Petr


 Route


 PRN Reason  Start Time


 Stop Time Status Last Admin


Dose Admin


 


 Acetaminophen


  (Tylenol)  650 mg  Q4H  PRN


 ORAL


 fever  2/23/19 19:00


 3/25/19 18:59   


 


 


 Al Hydroxide/Mg


 Hydroxide


  (Mylanta II)  30 ml  Q6H  PRN


 ORAL


 dyspepsia  2/23/19 19:00


 3/25/19 18:59   


 


 


 Dextrose


  (Dextrose 50%)  25 ml  Q30M  PRN


 IV


 Hypoglycemia  2/23/19 19:00


 3/25/19 18:59   


 


 


 Dextrose


  (Dextrose 50%)  50 ml  Q30M  PRN


 IV


 Hypoglycemia  2/23/19 19:00


 3/25/19 18:59   


 


 


 Ferrous Sulfate


  (Feosol)  325 mg  DAILY


 ORAL


   2/24/19 09:00


 3/26/19 08:59   


 


 


 Heparin Sodium


  (Porcine)


  (Heparin 5000


 units/ml)  5,000 units  EVERY 12  HOURS


 SUBQ


   2/23/19 21:00


 3/25/19 20:59   


 


 


 Insulin Aspart


  (NovoLOG)    BEFORE MEALS AND  HS


 SUBQ


   2/23/19 21:00


 3/25/19 20:59   


 


 


 Lorazepam


  (Ativan 2mg/ml


 1ml)  0.5 mg  Q4H  PRN


 IV


 For Anxiety  2/23/19 19:00


 3/2/19 18:59   


 


 


 Lorazepam


  (Ativan 2mg/ml


 1ml)  2 mg  Q4H  PRN


 IV


 For Anxiety  2/23/19 19:00


 3/2/19 18:59   


 


 


 Metoprolol


 Tartrate


  (Lopressor)  25 mg  DAILY


 ORAL


   2/24/19 09:00


 3/26/19 08:59   


 


 


 Morphine Sulfate


  (Morphine


 Sulfate)  1 mg  Q4H  PRN


 IVP


 For Pain  2/23/19 19:00


 3/2/19 18:59   


 


 


 Ondansetron HCl


  (Zofran)  4 mg  Q6H  PRN


 IVP


 Nausea & Vomiting  2/23/19 19:00


 3/25/19 18:59   


 


 


 Polyethylene


 Glycol


  (Miralax)  17 gm  HSPRN  PRN


 ORAL


 Constipation  2/23/19 19:00


 3/25/19 18:59   


 


 


 Quetiapine


 Fumarate


  (SEROquel)  100 mg  Q12HR


 ORAL


   2/23/19 21:00


 3/25/19 20:59   


 


 


 Sertraline HCl


  (Zoloft)  100 mg  DAILY


 ORAL


   2/24/19 09:00


 3/26/19 08:59   


 


 


 Zolpidem Tartrate


  (Ambien)  5 mg  HSPRN  PRN


 ORAL


 Insomnia  2/23/19 19:00


 3/2/19 18:59   


 

















Tao Younger MD Feb 25, 2019 14:25

## 2019-02-25 NOTE — NUR
NURSE NOTES:

Pt is in bed, asleep. No acute distress noted. Pt ate some food. Pt is calm. No behavioral 
issues thus far during this shift.
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
## 2019-02-25 NOTE — NUR
NURSE NOTES:

Received pt from RN EVE. Pt is alert and orient x2. pt is in RA. No SOB or acute 
respiratory distress noted. pt has intact iv acces RH 20G SL. All needs attended, bed is 
locked and is in the lowest position. call light within easy reach. will continue to 
monitor.

## 2019-02-26 VITALS — SYSTOLIC BLOOD PRESSURE: 140 MMHG | DIASTOLIC BLOOD PRESSURE: 86 MMHG

## 2019-02-26 RX ADMIN — INSULIN ASPART SCH UNITS: 100 INJECTION, SOLUTION INTRAVENOUS; SUBCUTANEOUS at 16:30

## 2019-02-26 RX ADMIN — HEPARIN SODIUM SCH UNITS: 5000 INJECTION INTRAVENOUS; SUBCUTANEOUS at 09:00

## 2019-02-26 RX ADMIN — HEPARIN SODIUM SCH UNITS: 5000 INJECTION INTRAVENOUS; SUBCUTANEOUS at 20:28

## 2019-02-26 RX ADMIN — METOPROLOL TARTRATE SCH MG: 25 TABLET, FILM COATED ORAL at 09:00

## 2019-02-26 RX ADMIN — INSULIN ASPART SCH UNITS: 100 INJECTION, SOLUTION INTRAVENOUS; SUBCUTANEOUS at 06:30

## 2019-02-26 RX ADMIN — INSULIN ASPART SCH UNITS: 100 INJECTION, SOLUTION INTRAVENOUS; SUBCUTANEOUS at 20:28

## 2019-02-26 RX ADMIN — SERTRALINE HYDROCHLORIDE SCH MG: 100 TABLET, FILM COATED ORAL at 09:00

## 2019-02-26 RX ADMIN — INSULIN ASPART SCH UNITS: 100 INJECTION, SOLUTION INTRAVENOUS; SUBCUTANEOUS at 11:30

## 2019-02-26 NOTE — NUR
Social Service Note



SW spoke with Jamilah at [facility] [phone].  Jamilah requested patient's information to 
be faxed for clinical review, [phone] (f) alternative fax # [phone].  Jamilah aware 
patient will require a clinician evaluation for [phone] hold.  Will follow up.

## 2019-02-26 NOTE — NUR
REHAB MED PT NOTE



CONSULT LANDY GRANADOTED, PATIENT DEFERRED CONTINUED PT DURING STAY. RECOMMEND RETURN 
TO [facility] LOCATION. DC PT ORDER, SECONDARY TO PATIENT REFUSING.  



KULDEEP GAMBLE PT DPT

-------------------------------------------------------------------------------

Addendum: 02/26/19 at 1240 by KULDEEP GAMBLE PT

-------------------------------------------------------------------------------

Amended: Links added.

## 2019-02-26 NOTE — GENERAL PROGRESS NOTE
Assessment/Plan


Problem List:  


(1) UTI (urinary tract infection)


ICD Codes:  N39.0 - Urinary tract infection, site not specified


SNOMED:  19492080


(2) Altered mental status


ICD Codes:  R41.82 - Altered mental status, unspecified


SNOMED:  783726175


(3) Allergic reaction


ICD Codes:  T78.40XA - Allergy, unspecified, initial encounter


SNOMED:  292925742


(4) Anxiety


ICD Codes:  F41.9 - Anxiety disorder, unspecified


SNOMED:  11367231


(5) Schizophrenia


ICD Codes:  F20.9 - Schizophrenia, unspecified


SNOMED:  58100791


(6) Encephalopathy


ICD Codes:  G93.40 - Encephalopathy, unspecified


SNOMED:  61141623


Status:  unchanged


Assessment/Plan


pt diet abx cbc bmp am psyc transfer





Subjective


Constitutional:  Reports: weakness


Allergies:  


Coded Allergies:  


     No Known Allergies (Unverified , 2/23/19)


All Systems:  reviewed and negative except above


Subjective


calm in room confused





Objective





Last 24 Hour Vital Signs








  Date Time  Temp Pulse Resp B/P (MAP) Pulse Ox O2 Delivery O2 Flow Rate FiO2


 


2/26/19 09:00      Room Air  


 


2/25/19 21:00      Room Air  


 


2/25/19 16:00 98.3 68 20 127/67 (87) 99   

















Intake and Output  


 


 2/25/19 2/26/19





 19:00 07:00


 


Intake Total 480 ml 100 ml


 


Balance 480 ml 100 ml


 


  


 


Intake Oral 480 ml 100 ml


 


# Voids 2 1








Height (Feet):  6


Height (Inches):  0.00


Weight (Pounds):  192


General Appearance:  lethargic


EENT:  normal ENT inspection


Neck:  normal alignment


Cardiovascular:  normal peripheral pulses, normal rate, regular rhythm


Respiratory/Chest:  chest wall non-tender, lungs clear, normal breath sounds


Abdomen:  normal bowel sounds, non tender, soft


Extremities:  normal inspection


Edema:  no edema noted Arm (L), no edema noted Arm (R), no edema noted Leg (L), 

no edema noted Leg (R), no edema noted Pedal (L), no edema noted Pedal (R), no 

edema noted Generalized


Neurologic:  motor weakness


Skin:  normal pigmentation, warm/dry











Campos Lakhani DO Feb 26, 2019 12:53

## 2019-02-26 NOTE — PHYSICIAN QUERY
--------- THIS DOCUMENT IS A PERMANENT PART OF THE MEDICAL RECORD ---------



*****PLEASE COMPLETE DOCUMENT BEFORE SIGNING*****





Dear Dr. JESSICA SMITH      Date: 2/25/19

/CDS Name: JAIMIE HERNANDEZ       / CDS Phone #________________



Exercise your independent professional judgment when responding to query. 
Question asked do not imply a particular answer is desired/expected.



Clinical Documentation States:



"ACUTE ENCEPHALOPATHY, UTI" -- documented in pulmonary consultation note



"Paranoid Schizophrenia w/ acute exacerbation" -- documented in Dr. Madrigal's 
Consultation Note



Clinical Findings Show:

Urinalysis = 1+ esterase, wbc 5-10, moderate bacteria

UA Culture = Staphylococcal sp coagulase negative





Please indicate the nature of the condition below:



   [x] Metabolic Encephalopathy

   [] Toxic Encephalopathy

   [] Toxic - Metabolic Encephalopathy

   [] Progressive Encephalopathy

   [] Encephalopathy, Other ______________________

   [] Other: ____________________________________

   [] Not Applicable





Condition Present on Admission:       [x] Yes            [] No             []
Clinically Undeterminable



Please also document in your Progress Notes and/or Discharge Summary and 
indicate if the condition was present on admission.





_________________________      ___________________

AZALIA SMITH M.D.         DATE & TIME
[facility]
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
## 2019-02-26 NOTE — NUR
NURSE NOTES:

Patient alert x1, psychosis, didn't let in to his room, "get out of here, I don't need 
anything". on room air, no sign of distress and shortness of breath; facial brusis on left 
eye and edema on the eye cite; didn't let me see the IV cite; according to the report from 
PM nurse, Constanza been refusing all the meds, vital sings and labs; PM nurse communicated DR Lakhani to let patient's state, MD refered the case to MD Madrigal, PM nurse communicated MD Madrigal, didn't receive a call back, I will follow up on that; Bed at lowest position, side 
rails up, breaks engaged. Will keep monitoring.

## 2019-02-26 NOTE — NUR
NURSE NOTES:

Patient pulled the IV line out, didn't let me to try to get another IV line on him. MD Lakhani 
and MD Madrigal is aware.

## 2019-02-26 NOTE — PROGRESS NOTE
DATE:  02/26/2019

SUBJECTIVE:  This is a 58-year-old male patient with encephalopathy, also

mood lability, confusion, and disorganized thought process worsened by

stress of his medical illness.  That is why his attending has requested

daily psychiatric consultation.



MENTAL STATUS EXAMINATION:  This is a 58-year-old male.  Appearance is

disheveled.  Attitude, irritable and agitated.  Affect, guarded and

restricted.  Intellect poor.  Mood depressed and anxious.  Motor activity,

psychomotor agitation.  Attention is poor.  Orientation x2.  Speech is

pressured.  Thought process, disorganized and illogical.  Insight and

judgment is poor.



DIAGNOSIS:  Paranoid schizophrenia with acute exacerbation.



PLAN:  Treat him with Seroquel 100 mg twice a day.  Provide him with 20

minutes of cognitive behavioral therapy to help him identify his automatic

negative thoughts and to help him to convert those negative thoughts to

more positive thoughts to reduce depression, anxiety, and mood lability.

Chart reviewed and discussed with staff.









  ______________________________________________

  Mili Madrigal M.D. DR:  KIRSTIE

D:  02/26/2019 08:09

T:  02/26/2019 14:00

JOB#:  [id number]

CC:
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
## 2019-02-26 NOTE — PULMONOLOGY PROGRESS NOTE
Assessment/Plan


Problems:  


(1) Altered mental status


(2) Schizophrenia


(3) Anxiety


(4) Altered level of consciousness


Assessment/Plan


doing better


all reviewed


symptomatic treatment


check electrolytes


fu psych recommendations


dvt prophylaxis


dc planning





Subjective


ROS Limited/Unobtainable:  Yes


Constitutional:  Reports: no symptoms


HEENT:  Repors: no symptoms


Allergies:  


Coded Allergies:  


     No Known Allergies (Unverified , 2/23/19)





Objective





Last 24 Hour Vital Signs








  Date Time  Temp Pulse Resp B/P (MAP) Pulse Ox O2 Delivery O2 Flow Rate FiO2


 


2/26/19 09:00      Room Air  


 


2/25/19 21:00      Room Air  


 


2/25/19 16:00 98.3 68 20 127/67 (87) 99   

















Intake and Output  


 


 2/25/19 2/26/19





 19:00 07:00


 


Intake Total 480 ml 100 ml


 


Balance 480 ml 100 ml


 


  


 


Intake Oral 480 ml 100 ml


 


# Voids 2 1








Objective


General Appearance:  WD/WN


HEENT:  normocephalic


Respiratory/Chest:  chest wall non-tender, lungs clear, normal breath sounds


Breasts:  no masses


Cardiovascular:  normal peripheral pulses, normal rate


Abdomen:  normal bowel sounds, soft, non tender


Extremities:  no cyanosis


Skin:  no rash





Microbiology








 Date/Time


Source Procedure


Growth Status


 


 


 2/23/19 16:05


Nasal Nares MRSA Culture - Final


NO METHICILLIN RESISTANT STAPH AUREUS... Complete


 


 2/23/19 15:30


Urine,Clean Catch Urine Culture - Final


Staphylococcus Sp Coag Neg Complete





 2/23/19 16:05


Rectum VRE Culture - Final


NO VANCOMYCIN RESISTANT ENTEROCOCCUS ... Complete


 


 2/23/19 16:05


Rectum - Final


NO CARBAPENEM-RESISTANT ENTEROBACTERI... Complete











Current Medications








 Medications


  (Trade)  Dose


 Ordered  Sig/Petr


 Route


 PRN Reason  Start Time


 Stop Time Status Last Admin


Dose Admin


 


 Acetaminophen


  (Tylenol)  650 mg  Q4H  PRN


 ORAL


 fever  2/23/19 19:00


 3/25/19 18:59   


 


 


 Al Hydroxide/Mg


 Hydroxide


  (Mylanta II)  30 ml  Q6H  PRN


 ORAL


 dyspepsia  2/23/19 19:00


 3/25/19 18:59   


 


 


 Dextrose


  (Dextrose 50%)  25 ml  Q30M  PRN


 IV


 Hypoglycemia  2/23/19 19:00


 3/25/19 18:59   


 


 


 Dextrose


  (Dextrose 50%)  50 ml  Q30M  PRN


 IV


 Hypoglycemia  2/23/19 19:00


 3/25/19 18:59   


 


 


 Ferrous Sulfate


  (Feosol)  325 mg  DAILY


 ORAL


   2/24/19 09:00


 3/26/19 08:59   


 


 


 Heparin Sodium


  (Porcine)


  (Heparin 5000


 units/ml)  5,000 units  EVERY 12  HOURS


 SUBQ


   2/23/19 21:00


 3/25/19 20:59   


 


 


 Insulin Aspart


  (NovoLOG)    BEFORE MEALS AND  HS


 SUBQ


   2/23/19 21:00


 3/25/19 20:59   


 


 


 Lorazepam


  (Ativan 2mg/ml


 1ml)  0.5 mg  Q4H  PRN


 IV


 For Anxiety  2/23/19 19:00


 3/2/19 18:59   


 


 


 Lorazepam


  (Ativan 2mg/ml


 1ml)  2 mg  Q4H  PRN


 IV


 For Anxiety  2/23/19 19:00


 3/2/19 18:59   


 


 


 Metoprolol


 Tartrate


  (Lopressor)  25 mg  DAILY


 ORAL


   2/24/19 09:00


 3/26/19 08:59   


 


 


 Morphine Sulfate


  (Morphine


 Sulfate)  1 mg  Q4H  PRN


 IVP


 For Pain  2/23/19 19:00


 3/2/19 18:59   


 


 


 Ondansetron HCl


  (Zofran)  4 mg  Q6H  PRN


 IVP


 Nausea & Vomiting  2/23/19 19:00


 3/25/19 18:59   


 


 


 Polyethylene


 Glycol


  (Miralax)  17 gm  HSPRN  PRN


 ORAL


 Constipation  2/23/19 19:00


 3/25/19 18:59   


 


 


 Quetiapine


 Fumarate


  (SEROquel)  100 mg  Q12HR


 ORAL


   2/23/19 21:00


 3/25/19 20:59   


 


 


 Sertraline HCl


  (Zoloft)  100 mg  DAILY


 ORAL


   2/24/19 09:00


 3/26/19 08:59   


 


 


 Zolpidem Tartrate


  (Ambien)  5 mg  HSPRN  PRN


 ORAL


 Insomnia  2/23/19 19:00


 3/2/19 18:59   


 

















Tao Younger MD Feb 26, 2019 14:08

## 2019-02-27 VITALS — DIASTOLIC BLOOD PRESSURE: 84 MMHG | SYSTOLIC BLOOD PRESSURE: 134 MMHG

## 2019-02-27 RX ADMIN — METOPROLOL TARTRATE SCH MG: 25 TABLET, FILM COATED ORAL at 08:36

## 2019-02-27 RX ADMIN — HEPARIN SODIUM SCH UNITS: 5000 INJECTION INTRAVENOUS; SUBCUTANEOUS at 21:00

## 2019-02-27 RX ADMIN — INSULIN ASPART SCH UNITS: 100 INJECTION, SOLUTION INTRAVENOUS; SUBCUTANEOUS at 06:30

## 2019-02-27 RX ADMIN — SERTRALINE HYDROCHLORIDE SCH MG: 100 TABLET, FILM COATED ORAL at 08:36

## 2019-02-27 RX ADMIN — INSULIN ASPART SCH UNITS: 100 INJECTION, SOLUTION INTRAVENOUS; SUBCUTANEOUS at 12:16

## 2019-02-27 RX ADMIN — HEPARIN SODIUM SCH UNITS: 5000 INJECTION INTRAVENOUS; SUBCUTANEOUS at 08:36

## 2019-02-27 RX ADMIN — INSULIN ASPART SCH UNITS: 100 INJECTION, SOLUTION INTRAVENOUS; SUBCUTANEOUS at 21:00

## 2019-02-27 RX ADMIN — INSULIN ASPART SCH UNITS: 100 INJECTION, SOLUTION INTRAVENOUS; SUBCUTANEOUS at 17:21

## 2019-02-27 NOTE — NUR
NURSE NOTES:

Received patient awake in bed with bouts of boisterous laughter. No s/s of acute distress. 
No IV site, bed on lowest position, 2 side rails up, urinal at the bedside, will monitor 
behavior closely.

## 2019-02-27 NOTE — CONSULTATION
DATE OF CONSULTATION:  02/25/2019

NOTE:  POOR AUDIO



CONSULTING PHYSICIAN:  Shalini Coyne PsyD.



TREATING ATTENDING PHYSICIAN:  Campos Lakhani D.O.



HISTORY OF PRESENT ILLNESS:  This patient is a male patient, he is 58 years

old from [place].  The patient has an extensive

history of mental illness.  The patient was brought into the hospital for

weakness, psychosis, _____ left eye ecchymosis.  The patient has been very

agitated, irritable, and selectively participating in treatment and he is

referred for psychotherapeutic services.  This clinician assessed this

patient.  The patient responds to internal stimuli, extremely agitated,

irritable, and has schizophrenia.  The patient states _____ that people

are out to get him and is paranoid.  The patient states that he _____

hospital and this is _____ bothering him.  For these reasons, he becomes

anxious, irritable, and agitated.  He has difficulty coping with his

current stressors and requires hospitalization for stabilization of his

symptoms.  He denies suicidal or homicidal thoughts of ideation.  He does

have auditory hallucinations, hearing voices.



PAST MEDICAL HISTORY:  Include a history of allergic reaction, urinary

tract infection.



ALLERGIES:  The patient has possible allergies, unknown.



PSYCHIATRIC HISTORY:  The patient has a history of paranoid schizophrenia

and has been treated with psychotropic medications in the past.



SOCIAL HISTORY:  This patient is a 58-year-old single male patient from

[place].  Financially sustained through [employer].



MENTAL STATUS EXAMINATION:  The patient is alert and oriented to person and

place.  His mood is irritable.  Affect is labile.  Thought process is

disorganized.  Thought content is delusional.  He has poor attention and

concentration.  Poor insight, judgment, and impulse control.



DIAGNOSIS:  Paranoid schizophrenia.



THIS CLINICIAN ASSESSED THE PATIENT.  PROVIDED THE PATIENT WITH:



1. Reality orientation, focused on improvement of cognitive function of

an individual who is confused and disoriented.  Oriented to person, place,

time, and situation.

2. Provided the patient with therapy focused on _____ techniques _____

problems and emotional ability, addressing the patient's condition,

cognitive distortions and redirecting them, discussing _____ and positive

coping skills.  Continue _____.  Encouraging the patient to articulate his

thoughts utilizing positive communication skills _____.

3. Continue with behavioral management for this patient.  This clinician

has reviewed the patient's chart and discussed treatment with treatment

team.



Plan is to maintain medication compliance _____ positive coping skills.

_____.









  ______________________________________________

  Shalini Coyne PsyD.





DR:  VIDYA

D:  02/26/2019 15:27

T:  02/27/2019 04:12

JOB#:  [id number]

CC:

## 2019-02-27 NOTE — PROGRESS NOTE
DATE:  02/27/2019

SUBJECTIVE:  The patient is a 58-year-old male.  Still very confused,

disorganized, mood lability.  _____.  Does require inpatient treatment at

this time.  He has encephalopathy, confusion, mood lability.



MENTAL STATUS EXAMINATION:  This is a 58-year-old male.  Appearance is

disheveled.  Attitude, irritable and agitated.  Affect, guarded and

restricted.  Intellect poor.  Mood depressed and anxious.  Motor activity,

psychomotor agitation.  Attention is poor.  Orientation x2.  Speech is

pressured.  Thought process, disorganized and illogical.  Insight and

judgment is poor.



DIAGNOSIS:  Paranoid schizophrenia acute exacerbation.



PLAN:  We will continue working with _____ psychiatric facility because he

has been very combative with staff.  He has been refusing his medication.

Also provided him with 20 minutes of cognitive behavioral therapy to help

him identify automatic negative thoughts and to help him to convert those

negative thoughts to more positive thoughts to reduce depression, anxiety,

and suicidality.  Chart reviewed and discussed with staff.  20 minutes of

cognitive behavioral therapy provided.









  ______________________________________________

  Mili Madrigal M.D.





DR:  KIRSTIE

D:  02/27/2019 11:23

T:  02/27/2019 16:35

JOB#:  [id number]

CC:

## 2019-02-27 NOTE — NUR
Social Service Note



NAINA spoke with Elham at [facility] [phone] administration declined acceptance due to 
patient being medically complex and potential placement issue.  NAINA informed Dr. Lakhani and 
Dr. Madrigal.

## 2019-02-27 NOTE — INFECTIOUS DISEASES PROG NOTE
Assessment/Plan


Assessment/Plan


Abx:


Ceftriaxone x1 2/23





Assessment:


Acute encephalopathy





pyuria/bacteriuria- asymptomatic


  -u/a wbc 5-10, nit neg, leuk +1; ucx >100K CONS (colonizer)





Afebrile


No leukocytosis





L  face ecchimosis- ?trauma


  -CT head: No acute intracranial findings.  Soft tissue swelling in the 

frontal scalp with possible subgaleal hematoma.  Partial opacification of the 

left mastoid air cells, suggesting left 


mastoid effusion.


  -CT facial bones:   Partial fluid opacification of the left mastoid air cells

, suggesting the left mastoid effusion.


 





HTN


DM


COPD


 major depressive disorder


 schizophrenia


anxiety disorder


NH resident 





Plan:


-Continue to monitor off abx


  -ok to discharge from [facility]


-f/u cx


-Monitor CBC/CMP, temperatures


-aspiration precautions





Thank you for this consultation. Will continue to follow along with you.


Discussed with RN.





Subjective


Allergies:  


Coded Allergies:  


     No Known Allergies (Unverified , 2/23/19)


Subjective


afebrile 


off abx





Objective


Vital Signs





Last 24 Hour Vital Signs








  Date Time  Temp Pulse Resp B/P (MAP) Pulse Ox O2 Delivery O2 Flow Rate FiO2


 


2/27/19 09:00      Room Air  


 


2/27/19 00:00 97.4 90 19 134/84 (101) 99   


 


2/26/19 21:28 97.5 85 19 140/86 (104) 98   


 


2/26/19 21:00      Room Air  








Height (Feet):  6


Height (Inches):  0.00


Weight (Pounds):  191


Objective


GENERAL:  Slightly confused in bed, sleepy, refusing to answer questions.


CARDIOVASCULAR:  No murmur.


LUNGS:  Poor exchange.


ABDOMEN:  Bowel sounds positive.


EXTREMITIES:  Show no cyanosis or edema.


HEENT:  Left periorbital ecchymosis noted.


NEUROLOGIC:  The patient moves all extremities, slightly weak.





Current Medications








 Medications


  (Trade)  Dose


 Ordered  Sig/Petr


 Route


 PRN Reason  Start Time


 Stop Time Status Last Admin


Dose Admin


 


 Acetaminophen


  (Tylenol)  650 mg  Q4H  PRN


 ORAL


 fever  2/23/19 19:00


 3/25/19 18:59   


 


 


 Al Hydroxide/Mg


 Hydroxide


  (Mylanta II)  30 ml  Q6H  PRN


 ORAL


 dyspepsia  2/23/19 19:00


 3/25/19 18:59   


 


 


 Dextrose


  (Dextrose 50%)  25 ml  Q30M  PRN


 IV


 Hypoglycemia  2/23/19 19:00


 3/25/19 18:59   


 


 


 Dextrose


  (Dextrose 50%)  50 ml  Q30M  PRN


 IV


 Hypoglycemia  2/23/19 19:00


 3/25/19 18:59   


 


 


 Ferrous Sulfate


  (Feosol)  325 mg  DAILY


 ORAL


   2/24/19 09:00


 3/26/19 08:59   


 


 


 Heparin Sodium


  (Porcine)


  (Heparin 5000


 units/ml)  5,000 units  EVERY 12  HOURS


 SUBQ


   2/23/19 21:00


 3/25/19 20:59   


 


 


 Insulin Aspart


  (NovoLOG)    BEFORE MEALS AND  HS


 SUBQ


   2/23/19 21:00


 3/25/19 20:59  2/27/19 12:16


 


 


 Lorazepam


  (Ativan 2mg/ml


 1ml)  0.5 mg  Q4H  PRN


 IV


 For Anxiety  2/23/19 19:00


 3/2/19 18:59  2/26/19 17:10


 


 


 Lorazepam


  (Ativan 2mg/ml


 1ml)  2 mg  Q4H  PRN


 IV


 For Anxiety  2/23/19 19:00


 3/2/19 18:59   


 


 


 Metoprolol


 Tartrate


  (Lopressor)  25 mg  DAILY


 ORAL


   2/24/19 09:00


 3/26/19 08:59   


 


 


 Morphine Sulfate


  (Morphine


 Sulfate)  1 mg  Q4H  PRN


 IVP


 For Pain  2/23/19 19:00


 3/2/19 18:59   


 


 


 Ondansetron HCl


  (Zofran)  4 mg  Q6H  PRN


 IVP


 Nausea & Vomiting  2/23/19 19:00


 3/25/19 18:59   


 


 


 Polyethylene


 Glycol


  (Miralax)  17 gm  HSPRN  PRN


 ORAL


 Constipation  2/23/19 19:00


 3/25/19 18:59   


 


 


 Quetiapine


 Fumarate


  (SEROquel)  100 mg  Q12HR


 ORAL


   2/23/19 21:00


 3/25/19 20:59   


 


 


 Sertraline HCl


  (Zoloft)  100 mg  DAILY


 ORAL


   2/24/19 09:00


 3/26/19 08:59   


 


 


 Zolpidem Tartrate


  (Ambien)  5 mg  HSPRN  PRN


 ORAL


 Insomnia  2/23/19 19:00


 3/2/19 18:59   


 

















Toya Salomon M.D. Feb 27, 2019 13:13

## 2019-02-27 NOTE — GENERAL PROGRESS NOTE
Assessment/Plan


Problem List:  


(1) UTI (urinary tract infection)


ICD Codes:  N39.0 - Urinary tract infection, site not specified


SNOMED:  58437392


(2) Altered mental status


ICD Codes:  R41.82 - Altered mental status, unspecified


SNOMED:  455693697


(3) Allergic reaction


ICD Codes:  T78.40XA - Allergy, unspecified, initial encounter


SNOMED:  716578882


(4) Anxiety


ICD Codes:  F41.9 - Anxiety disorder, unspecified


SNOMED:  09552322


(5) Schizophrenia


ICD Codes:  F20.9 - Schizophrenia, unspecified


SNOMED:  52264314


(6) Encephalopathy


ICD Codes:  G93.40 - Encephalopathy, unspecified


SNOMED:  09339711


Status:  unchanged


Assessment/Plan


pt diet abx cbc bmp am psyc transfer





Subjective


Constitutional:  Reports: weakness


Allergies:  


Coded Allergies:  


     No Known Allergies (Unverified , 2/23/19)


All Systems:  reviewed and negative except above


Subjective


sleepy calm in room confused





Objective





Last 24 Hour Vital Signs








  Date Time  Temp Pulse Resp B/P (MAP) Pulse Ox O2 Delivery O2 Flow Rate FiO2


 


2/27/19 00:00 97.4 90 19 134/84 (101) 99   


 


2/26/19 21:28 97.5 85 19 140/86 (104) 98   


 


2/26/19 21:00      Room Air  


 


2/26/19 09:00      Room Air  

















Intake and Output  


 


 2/26/19 2/27/19





 19:00 07:00


 


Intake Total 500 ml 


 


Output Total 900 ml 


 


Balance -400 ml 


 


  


 


Intake Oral 500 ml 


 


Output Urine Total 900 ml 


 


# Voids  4








Height (Feet):  6


Height (Inches):  0.00


Weight (Pounds):  191


General Appearance:  lethargic, confused


EENT:  normal ENT inspection


Neck:  normal alignment


Cardiovascular:  normal peripheral pulses, normal rate, regular rhythm


Respiratory/Chest:  chest wall non-tender, lungs clear, normal breath sounds


Abdomen:  normal bowel sounds, non tender, soft


Extremities:  normal inspection


Edema:  no edema noted Arm (L), no edema noted Arm (R), no edema noted Leg (L), 

no edema noted Leg (R), no edema noted Pedal (L), no edema noted Pedal (R), no 

edema noted Generalized


Neurologic:  motor weakness


Skin:  normal pigmentation, warm/dry











Campos Lakhani DO Feb 27, 2019 07:04

## 2019-02-27 NOTE — PULMONOLOGY PROGRESS NOTE
Assessment/Plan


Problems:  


(1) Altered mental status


(2) Schizophrenia


(3) Anxiety


(4) Altered level of consciousness


Assessment/Plan


no new complains


doing better


all reviewed


symptomatic treatment


check electrolytes


fu psych recommendations


dvt prophylaxis


dc planning





Subjective


ROS Limited/Unobtainable:  No


Constitutional:  Reports: no symptoms


HEENT:  Repors: no symptoms


Allergies:  


Coded Allergies:  


     No Known Allergies (Unverified , 2/23/19)





Objective





Last 24 Hour Vital Signs








  Date Time  Temp Pulse Resp B/P (MAP) Pulse Ox O2 Delivery O2 Flow Rate FiO2


 


2/27/19 09:00      Room Air  


 


2/27/19 00:00 97.4 90 19 134/84 (101) 99   


 


2/26/19 21:28 97.5 85 19 140/86 (104) 98   


 


2/26/19 21:00      Room Air  

















Intake and Output  


 


 2/26/19 2/27/19





 19:00 07:00


 


Intake Total 500 ml 


 


Output Total 900 ml 


 


Balance -400 ml 


 


  


 


Intake Oral 500 ml 


 


Output Urine Total 900 ml 


 


# Voids  4








Objective


General Appearance:  WD/WN


HEENT:  normocephalic


Respiratory/Chest:  chest wall non-tender, lungs clear, normal breath sounds


Breasts:  no masses


Cardiovascular:  normal peripheral pulses, normal rate


Abdomen:  normal bowel sounds, soft, non tender


Extremities:  no cyanosis


Skin:  no rash





Current Medications








 Medications


  (Trade)  Dose


 Ordered  Sig/Petr


 Route


 PRN Reason  Start Time


 Stop Time Status Last Admin


Dose Admin


 


 Acetaminophen


  (Tylenol)  650 mg  Q4H  PRN


 ORAL


 fever  2/23/19 19:00


 3/25/19 18:59   


 


 


 Al Hydroxide/Mg


 Hydroxide


  (Mylanta II)  30 ml  Q6H  PRN


 ORAL


 dyspepsia  2/23/19 19:00


 3/25/19 18:59   


 


 


 Dextrose


  (Dextrose 50%)  25 ml  Q30M  PRN


 IV


 Hypoglycemia  2/23/19 19:00


 3/25/19 18:59   


 


 


 Dextrose


  (Dextrose 50%)  50 ml  Q30M  PRN


 IV


 Hypoglycemia  2/23/19 19:00


 3/25/19 18:59   


 


 


 Ferrous Sulfate


  (Feosol)  325 mg  DAILY


 ORAL


   2/24/19 09:00


 3/26/19 08:59   


 


 


 Heparin Sodium


  (Porcine)


  (Heparin 5000


 units/ml)  5,000 units  EVERY 12  HOURS


 SUBQ


   2/23/19 21:00


 3/25/19 20:59   


 


 


 Insulin Aspart


  (NovoLOG)    BEFORE MEALS AND  HS


 SUBQ


   2/23/19 21:00


 3/25/19 20:59  2/27/19 12:16


 


 


 Lorazepam


  (Ativan 2mg/ml


 1ml)  0.5 mg  Q4H  PRN


 IV


 For Anxiety  2/23/19 19:00


 3/2/19 18:59  2/26/19 17:10


 


 


 Lorazepam


  (Ativan 2mg/ml


 1ml)  2 mg  Q4H  PRN


 IV


 For Anxiety  2/23/19 19:00


 3/2/19 18:59   


 


 


 Metoprolol


 Tartrate


  (Lopressor)  25 mg  DAILY


 ORAL


   2/24/19 09:00


 3/26/19 08:59   


 


 


 Morphine Sulfate


  (Morphine


 Sulfate)  1 mg  Q4H  PRN


 IVP


 For Pain  2/23/19 19:00


 3/2/19 18:59   


 


 


 Ondansetron HCl


  (Zofran)  4 mg  Q6H  PRN


 IVP


 Nausea & Vomiting  2/23/19 19:00


 3/25/19 18:59   


 


 


 Polyethylene


 Glycol


  (Miralax)  17 gm  HSPRN  PRN


 ORAL


 Constipation  2/23/19 19:00


 3/25/19 18:59   


 


 


 Quetiapine


 Fumarate


  (SEROquel)  100 mg  Q12HR


 ORAL


   2/23/19 21:00


 3/25/19 20:59   


 


 


 Sertraline HCl


  (Zoloft)  100 mg  DAILY


 ORAL


   2/24/19 09:00


 3/26/19 08:59   


 


 


 Zolpidem Tartrate


  (Ambien)  5 mg  HSPRN  PRN


 ORAL


 Insomnia  2/23/19 19:00


 3/2/19 18:59   


 

















Tao Younger MD Feb 27, 2019 14:05

## 2019-02-28 VITALS — DIASTOLIC BLOOD PRESSURE: 66 MMHG | SYSTOLIC BLOOD PRESSURE: 108 MMHG

## 2019-02-28 RX ADMIN — INSULIN ASPART SCH UNITS: 100 INJECTION, SOLUTION INTRAVENOUS; SUBCUTANEOUS at 21:00

## 2019-02-28 RX ADMIN — HEPARIN SODIUM SCH UNITS: 5000 INJECTION INTRAVENOUS; SUBCUTANEOUS at 21:00

## 2019-02-28 RX ADMIN — INSULIN ASPART SCH UNITS: 100 INJECTION, SOLUTION INTRAVENOUS; SUBCUTANEOUS at 11:30

## 2019-02-28 RX ADMIN — SERTRALINE HYDROCHLORIDE SCH MG: 100 TABLET, FILM COATED ORAL at 10:14

## 2019-02-28 RX ADMIN — METOPROLOL TARTRATE SCH MG: 25 TABLET, FILM COATED ORAL at 10:14

## 2019-02-28 RX ADMIN — INSULIN ASPART SCH UNITS: 100 INJECTION, SOLUTION INTRAVENOUS; SUBCUTANEOUS at 06:30

## 2019-02-28 RX ADMIN — HEPARIN SODIUM SCH UNITS: 5000 INJECTION INTRAVENOUS; SUBCUTANEOUS at 10:15

## 2019-02-28 RX ADMIN — INSULIN ASPART SCH UNITS: 100 INJECTION, SOLUTION INTRAVENOUS; SUBCUTANEOUS at 17:07

## 2019-02-28 NOTE — PULMONOLOGY PROGRESS NOTE
Assessment/Plan


Problems:  


(1) Altered mental status


(2) Schizophrenia


(3) Anxiety


(4) Altered level of consciousness


Assessment/Plan


doing better


doens't want to talk


no new complains


doing better


all reviewed


symptomatic treatment


check electrolytes


fu psych recommendations


dvt prophylaxis


dc planning





Subjective


Constitutional:  Reports: no symptoms


HEENT:  Repors: no symptoms


Respiratory:  Reports: no symptoms


Allergies:  


Coded Allergies:  


     No Known Allergies (Unverified , 2/23/19)





Objective





Last 24 Hour Vital Signs








  Date Time  Temp Pulse Resp B/P (MAP) Pulse Ox O2 Delivery O2 Flow Rate FiO2


 


2/28/19 09:00      Room Air  


 


2/28/19 08:14 98.1 71 18 108/66 (80) 98   


 


2/27/19 21:00      Room Air  

















Intake and Output  


 


 2/27/19 2/28/19





 18:59 06:59


 


Intake Total  700 ml


 


Balance  700 ml


 


  


 


Intake Oral  700 ml


 


# Voids  3








Objective


General Appearance:  WD/WN


HEENT:  normocephalic


Respiratory/Chest:  chest wall non-tender, lungs clear, normal breath sounds


Breasts:  no masses


Cardiovascular:  normal peripheral pulses, normal rate


Abdomen:  normal bowel sounds, soft, non tender


Extremities:  no cyanosis


Skin:  no rash





Current Medications








 Medications


  (Trade)  Dose


 Ordered  Sig/Petr


 Route


 PRN Reason  Start Time


 Stop Time Status Last Admin


Dose Admin


 


 Acetaminophen


  (Tylenol)  650 mg  Q4H  PRN


 ORAL


 fever  2/23/19 19:00


 3/25/19 18:59   


 


 


 Al Hydroxide/Mg


 Hydroxide


  (Mylanta II)  30 ml  Q6H  PRN


 ORAL


 dyspepsia  2/23/19 19:00


 3/25/19 18:59   


 


 


 Dextrose


  (Dextrose 50%)  25 ml  Q30M  PRN


 IV


 Hypoglycemia  2/23/19 19:00


 3/25/19 18:59   


 


 


 Dextrose


  (Dextrose 50%)  50 ml  Q30M  PRN


 IV


 Hypoglycemia  2/23/19 19:00


 3/25/19 18:59   


 


 


 Ferrous Sulfate


  (Feosol)  325 mg  DAILY


 ORAL


   2/24/19 09:00


 3/26/19 08:59  2/28/19 10:14


 


 


 Heparin Sodium


  (Porcine)


  (Heparin 5000


 units/ml)  5,000 units  EVERY 12  HOURS


 SUBQ


   2/23/19 21:00


 3/25/19 20:59   


 


 


 Insulin Aspart


  (NovoLOG)    BEFORE MEALS AND  HS


 SUBQ


   2/23/19 21:00


 3/25/19 20:59  2/27/19 12:16


 


 


 Lorazepam


  (Ativan 2mg/ml


 1ml)  0.5 mg  Q4H  PRN


 IV


 For Anxiety  2/23/19 19:00


 3/2/19 18:59  2/26/19 17:10


 


 


 Lorazepam


  (Ativan 2mg/ml


 1ml)  2 mg  Q4H  PRN


 IV


 For Anxiety  2/23/19 19:00


 3/2/19 18:59   


 


 


 Metoprolol


 Tartrate


  (Lopressor)  25 mg  DAILY


 ORAL


   2/24/19 09:00


 3/26/19 08:59   


 


 


 Morphine Sulfate


  (Morphine


 Sulfate)  1 mg  Q4H  PRN


 IVP


 For Pain  2/23/19 19:00


 3/2/19 18:59   


 


 


 Ondansetron HCl


  (Zofran)  4 mg  Q6H  PRN


 IVP


 Nausea & Vomiting  2/23/19 19:00


 3/25/19 18:59   


 


 


 Polyethylene


 Glycol


  (Miralax)  17 gm  HSPRN  PRN


 ORAL


 Constipation  2/23/19 19:00


 3/25/19 18:59   


 


 


 Quetiapine


 Fumarate


  (SEROquel)  100 mg  Q12HR


 ORAL


   2/23/19 21:00


 3/25/19 20:59  2/28/19 10:14


 


 


 Sertraline HCl


  (Zoloft)  100 mg  DAILY


 ORAL


   2/24/19 09:00


 3/26/19 08:59  2/28/19 10:14


 


 


 Zolpidem Tartrate


  (Ambien)  5 mg  HSPRN  PRN


 ORAL


 Insomnia  2/23/19 19:00


 3/2/19 18:59   


 

















Tao Younger MD Feb 28, 2019 12:03

## 2019-02-28 NOTE — NUR
NURSE NOTES: RECEIVED PATIENT STANDING IN DOORWAY OF ROOM, ALERT/ORIENTED TO PERSON/PLACE, 
NO IV ACCESS, MD AWARE, ASSISTED PATIENT TO BED WITHOUT DIFFICULTY, DENIES PAIN. NO SIGNS 
AND SYMPTOMS OF ACUTE CARDIO RESPIRATORY DISTRESS/SHORTNESS OF BREATH, DENIES CHEST PAIN, NO 
EDEMA NOTED. DENIES GI DISCOMFORT, BATHROOM PRIVILEGES WITH ASSISTANCE, URINAL AT BEDSIDE. 
SIDE RAILS UP X2/BED IN LOWEST POSITION FOR SAFETY. CALL LIGHT WITHIN REACH. FREQUENT 
ROUNDING FOR SAFETY/NEEDS. CONTINUE WITH CURRENT PLAN OF CARE. NAD.

## 2019-02-28 NOTE — NUR
NURSE NOTES:

Receivd patient on bed, asleep. No IV access, MD aware. Bed in low and locked position, call 
light in reach. No signs of respiratory distress or pain. Room board updated, will continue 
to monitor.

## 2019-02-28 NOTE — NUR
NURSE NOTES:

Received patient in no apparent distress. A&OX2. Wandering vega way, reoriented. Bed in 
lowest position. Call light within reach. Will continue to monitor.

## 2019-02-28 NOTE — GENERAL PROGRESS NOTE
Assessment/Plan


Problem List:  


(1) UTI (urinary tract infection)


ICD Codes:  N39.0 - Urinary tract infection, site not specified


SNOMED:  92643952


(2) Altered mental status


ICD Codes:  R41.82 - Altered mental status, unspecified


SNOMED:  947099192


(3) Allergic reaction


ICD Codes:  T78.40XA - Allergy, unspecified, initial encounter


SNOMED:  665142374


(4) Anxiety


ICD Codes:  F41.9 - Anxiety disorder, unspecified


SNOMED:  11220746


(5) Schizophrenia


ICD Codes:  F20.9 - Schizophrenia, unspecified


SNOMED:  19388705


(6) Encephalopathy


ICD Codes:  G93.40 - Encephalopathy, unspecified


SNOMED:  55954304


Status:  stable, progressing


Assessment/Plan


pt diet abx cbc bmp am dc to snf if clear





Subjective


Constitutional:  Reports: weakness


Allergies:  


Coded Allergies:  


     No Known Allergies (Unverified , 2/23/19)


All Systems:  reviewed and negative except above


Subjective


sleepy calm in room confused





Objective





Last 24 Hour Vital Signs








  Date Time  Temp Pulse Resp B/P (MAP) Pulse Ox O2 Delivery O2 Flow Rate FiO2


 


2/28/19 09:00      Room Air  


 


2/28/19 08:14 98.1 71 18 108/66 (80) 98   


 


2/27/19 21:00      Room Air  

















Intake and Output  


 


 2/27/19 2/28/19





 19:00 07:00


 


Intake Total  700 ml


 


Balance  700 ml


 


  


 


Intake Oral  700 ml


 


# Voids  3








Height (Feet):  6


Height (Inches):  0.00


Weight (Pounds):  191


General Appearance:  lethargic, confused


EENT:  normal ENT inspection


Neck:  normal alignment


Cardiovascular:  normal peripheral pulses, normal rate, regular rhythm


Respiratory/Chest:  chest wall non-tender, lungs clear, normal breath sounds


Abdomen:  normal bowel sounds, non tender, soft


Extremities:  normal inspection


Edema:  no edema noted Arm (L), no edema noted Arm (R), no edema noted Leg (L), 

no edema noted Leg (R), no edema noted Pedal (L), no edema noted Pedal (R), no 

edema noted Generalized


Neurologic:  motor weakness


Skin:  normal pigmentation, warm/dry











Campos Lakhani DO Feb 28, 2019 14:14

## 2019-02-28 NOTE — PROGRESS NOTE
DATE:  02/28/2019

SUBJECTIVE:  This is a 58-year-old male patient who continues to have some

mood lability, confusion, some disorganized thought process, decline in

cognition below his baseline.  That is why, his attending has requested

daily psychiatric consultation.  This patient has encephalopathy, but also

confusion and extreme mood lability.



MENTAL STATUS EXAMINATION:  This is a 58-year-old male.  Appearance

disheveled.  Attitude, irritable and agitated.  Affect, guarded and

restricted.  Intellect poor.  Mood depressed and anxious.  Motor activity,

psychomotor agitation.  Attention span is poor.  Orientation x2.  Speech

is pressured.  Thought process, disorganized and illogical.  Insight and

judgment is poor, rule out psychomotor agitation.



DIAGNOSIS:  Paranoid schizophrenia with acute exacerbation.



PLAN:  Seroquel twice a day.  Provided him with 20 minutes of cognitive

behavioral therapy and insight-oriented psychotherapy to help him identify

into his psychiatric illness, so he has better impulse control and less

agitation on the unit.  Possible transfer to [facility] when medically cleared

due to extreme agitation, mood lability, disorganized thought process, and

decline in cognition below his baseline.









  ______________________________________________

  Mili Madrigal M.D.





DR:  KIRSTIE

D:  02/28/2019 09:21

T:  02/28/2019 14:34

JOB#:  [id number]

CC:

## 2019-02-28 NOTE — NUR
*-* DISCHARGE PLANNING *-*



PATIENT HAS BEEN REFERRED TO:



RIC[facility] ADAMS

P:[phone]

F:[phone]

## 2019-02-28 NOTE — INFECTIOUS DISEASES PROG NOTE
Assessment/Plan


Assessment/Plan


Abx:


Ceftriaxone x1 2/23





Assessment:


Acute encephalopathy





pyuria/bacteriuria- asymptomatic


  -u/a wbc 5-10, nit neg, leuk +1; ucx >100K CONS (colonizer)





Afebrile


No leukocytosis





L  face ecchimosis- ?trauma


  -CT head: No acute intracranial findings.  Soft tissue swelling in the 

frontal scalp with possible subgaleal hematoma.  Partial opacification of the 

left mastoid air cells, suggesting left 


mastoid effusion.


  -CT facial bones:   Partial fluid opacification of the left mastoid air cells

, suggesting the left mastoid effusion.


 





HTN


DM


COPD


 major depressive disorder


 schizophrenia


anxiety disorder


[facility] 





Plan:


-Continue to monitor off abx


  -ok to discharge from ID perspective


-f/u cx


-Monitor CBC/CMP, temperatures


-aspiration precautions


-discharge planning: awaiting placement





Thank you for this consultation. Will continue to follow along with you.


Discussed with RN.





Subjective


Allergies:  


Coded Allergies:  


     No Known Allergies (Unverified , 2/23/19)


Subjective


afebrile 


off abx





Objective


Vital Signs





Last 24 Hour Vital Signs








  Date Time  Temp Pulse Resp B/P (MAP) Pulse Ox O2 Delivery O2 Flow Rate FiO2


 


2/28/19 09:00      Room Air  


 


2/28/19 08:14 98.1 71 18 108/66 (80) 98   


 


2/27/19 21:00      Room Air  








Height (Feet):  6


Height (Inches):  0.00


Weight (Pounds):  191


Objective


GENERAL:  Slightly confused in bed, sleepy, refusing to answer questions.


CARDIOVASCULAR:  No murmur.


LUNGS:  Poor exchange.


ABDOMEN:  Bowel sounds positive.


EXTREMITIES:  Show no cyanosis or edema.


HEENT:  Left periorbital ecchymosis noted.


NEUROLOGIC:  The patient moves all extremities, slightly weak.





Current Medications








 Medications


  (Trade)  Dose


 Ordered  Sig/Petr


 Route


 PRN Reason  Start Time


 Stop Time Status Last Admin


Dose Admin


 


 Acetaminophen


  (Tylenol)  650 mg  Q4H  PRN


 ORAL


 fever  2/23/19 19:00


 3/25/19 18:59   


 


 


 Al Hydroxide/Mg


 Hydroxide


  (Mylanta II)  30 ml  Q6H  PRN


 ORAL


 dyspepsia  2/23/19 19:00


 3/25/19 18:59   


 


 


 Dextrose


  (Dextrose 50%)  25 ml  Q30M  PRN


 IV


 Hypoglycemia  2/23/19 19:00


 3/25/19 18:59   


 


 


 Dextrose


  (Dextrose 50%)  50 ml  Q30M  PRN


 IV


 Hypoglycemia  2/23/19 19:00


 3/25/19 18:59   


 


 


 Ferrous Sulfate


  (Feosol)  325 mg  DAILY


 ORAL


   2/24/19 09:00


 3/26/19 08:59  2/28/19 10:14


 


 


 Heparin Sodium


  (Porcine)


  (Heparin 5000


 units/ml)  5,000 units  EVERY 12  HOURS


 SUBQ


   2/23/19 21:00


 3/25/19 20:59   


 


 


 Insulin Aspart


  (NovoLOG)    BEFORE MEALS AND  HS


 SUBQ


   2/23/19 21:00


 3/25/19 20:59  2/27/19 12:16


 


 


 Lorazepam


  (Ativan 2mg/ml


 1ml)  0.5 mg  Q4H  PRN


 IV


 For Anxiety  2/23/19 19:00


 3/2/19 18:59  2/26/19 17:10


 


 


 Lorazepam


  (Ativan 2mg/ml


 1ml)  2 mg  Q4H  PRN


 IV


 For Anxiety  2/23/19 19:00


 3/2/19 18:59   


 


 


 Metoprolol


 Tartrate


  (Lopressor)  25 mg  DAILY


 ORAL


   2/24/19 09:00


 3/26/19 08:59   


 


 


 Morphine Sulfate


  (Morphine


 Sulfate)  1 mg  Q4H  PRN


 IVP


 For Pain  2/23/19 19:00


 3/2/19 18:59   


 


 


 Ondansetron HCl


  (Zofran)  4 mg  Q6H  PRN


 IVP


 Nausea & Vomiting  2/23/19 19:00


 3/25/19 18:59   


 


 


 Polyethylene


 Glycol


  (Miralax)  17 gm  HSPRN  PRN


 ORAL


 Constipation  2/23/19 19:00


 3/25/19 18:59   


 


 


 Quetiapine


 Fumarate


  (SEROquel)  100 mg  Q12HR


 ORAL


   2/23/19 21:00


 3/25/19 20:59  2/28/19 10:14


 


 


 Sertraline HCl


  (Zoloft)  100 mg  DAILY


 ORAL


   2/24/19 09:00


 3/26/19 08:59  2/28/19 10:14


 


 


 Zolpidem Tartrate


  (Ambien)  5 mg  HSPRN  PRN


 ORAL


 Insomnia  2/23/19 19:00


 3/2/19 18:59   


 

















Toya Salomon M.D. Feb 28, 2019 15:11

## 2019-03-01 VITALS — SYSTOLIC BLOOD PRESSURE: 123 MMHG | DIASTOLIC BLOOD PRESSURE: 85 MMHG

## 2019-03-01 RX ADMIN — HEPARIN SODIUM SCH UNITS: 5000 INJECTION INTRAVENOUS; SUBCUTANEOUS at 21:00

## 2019-03-01 RX ADMIN — METOPROLOL TARTRATE SCH MG: 25 TABLET, FILM COATED ORAL at 08:43

## 2019-03-01 RX ADMIN — INSULIN ASPART SCH UNITS: 100 INJECTION, SOLUTION INTRAVENOUS; SUBCUTANEOUS at 21:00

## 2019-03-01 RX ADMIN — INSULIN ASPART SCH UNITS: 100 INJECTION, SOLUTION INTRAVENOUS; SUBCUTANEOUS at 11:30

## 2019-03-01 RX ADMIN — INSULIN ASPART SCH UNITS: 100 INJECTION, SOLUTION INTRAVENOUS; SUBCUTANEOUS at 06:23

## 2019-03-01 RX ADMIN — SERTRALINE HYDROCHLORIDE SCH MG: 100 TABLET, FILM COATED ORAL at 08:43

## 2019-03-01 RX ADMIN — INSULIN ASPART SCH UNITS: 100 INJECTION, SOLUTION INTRAVENOUS; SUBCUTANEOUS at 16:18

## 2019-03-01 RX ADMIN — HEPARIN SODIUM SCH UNITS: 5000 INJECTION INTRAVENOUS; SUBCUTANEOUS at 08:43

## 2019-03-01 NOTE — PULMONOLOGY PROGRESS NOTE
Assessment/Plan


Problems:  


(1) Altered mental status


(2) Schizophrenia


(3) Anxiety


(4) Altered level of consciousness


Assessment/Plan


doing better


doens't want to talk


no new complains


doing better


all reviewed


symptomatic treatment


check electrolytes


fu psych recommendations


dvt prophylaxis


dc planning





Subjective


ROS Limited/Unobtainable:  No


Constitutional:  Reports: no symptoms


HEENT:  Repors: no symptoms


Allergies:  


Coded Allergies:  


     No Known Allergies (Unverified , 2/23/19)





Objective





Last 24 Hour Vital Signs








  Date Time  Temp Pulse Resp B/P (MAP) Pulse Ox O2 Delivery O2 Flow Rate FiO2


 


3/1/19 16:00   17     


 


3/1/19 12:00   17     


 


3/1/19 09:27      Room Air  


 


3/1/19 08:00   17     


 


3/1/19 04:00 97.5 74 18 123/85 (98) 99   


 


3/1/19 00:00   17     


 


2/28/19 21:00      Room Air  

















Intake and Output  


 


 2/28/19 3/1/19





 19:00 07:00


 


Intake Total  480 ml


 


Balance  480 ml


 


  


 


Intake Oral  480 ml


 


# Voids  2








Objective


General Appearance:  WD/WN


HEENT:  normocephalic


Respiratory/Chest:  chest wall non-tender, lungs clear, normal breath sounds


Breasts:  no masses


Cardiovascular:  normal peripheral pulses, normal rate


Abdomen:  normal bowel sounds, soft, non tender


Extremities:  no cyanosis


Skin:  no rash





Current Medications








 Medications


  (Trade)  Dose


 Ordered  Sig/Petr


 Route


 PRN Reason  Start Time


 Stop Time Status Last Admin


Dose Admin


 


 Acetaminophen


  (Tylenol)  650 mg  Q4H  PRN


 ORAL


 fever  2/23/19 19:00


 3/25/19 18:59   


 


 


 Al Hydroxide/Mg


 Hydroxide


  (Mylanta II)  30 ml  Q6H  PRN


 ORAL


 dyspepsia  2/23/19 19:00


 3/25/19 18:59   


 


 


 Dextrose


  (Dextrose 50%)  25 ml  Q30M  PRN


 IV


 Hypoglycemia  2/23/19 19:00


 3/25/19 18:59   


 


 


 Dextrose


  (Dextrose 50%)  50 ml  Q30M  PRN


 IV


 Hypoglycemia  2/23/19 19:00


 3/25/19 18:59   


 


 


 Ferrous Sulfate


  (Feosol)  325 mg  DAILY


 ORAL


   2/24/19 09:00


 3/26/19 08:59  2/28/19 10:14


 


 


 Heparin Sodium


  (Porcine)


  (Heparin 5000


 units/ml)  5,000 units  EVERY 12  HOURS


 SUBQ


   2/23/19 21:00


 3/25/19 20:59   


 


 


 Insulin Aspart


  (NovoLOG)    BEFORE MEALS AND  HS


 SUBQ


   2/23/19 21:00


 3/25/19 20:59  2/27/19 12:16


 


 


 Lorazepam


  (Ativan 2mg/ml


 1ml)  0.5 mg  Q4H  PRN


 IV


 For Anxiety  2/23/19 19:00


 3/2/19 18:59  2/26/19 17:10


 


 


 Lorazepam


  (Ativan 2mg/ml


 1ml)  2 mg  Q4H  PRN


 IV


 For Anxiety  2/23/19 19:00


 3/2/19 18:59   


 


 


 Metoprolol


 Tartrate


  (Lopressor)  25 mg  DAILY


 ORAL


   2/24/19 09:00


 3/26/19 08:59   


 


 


 Morphine Sulfate


  (Morphine


 Sulfate)  1 mg  Q4H  PRN


 IVP


 For Pain  2/23/19 19:00


 3/2/19 18:59   


 


 


 Ondansetron HCl


  (Zofran)  4 mg  Q6H  PRN


 IVP


 Nausea & Vomiting  2/23/19 19:00


 3/25/19 18:59   


 


 


 Polyethylene


 Glycol


  (Miralax)  17 gm  HSPRN  PRN


 ORAL


 Constipation  2/23/19 19:00


 3/25/19 18:59   


 


 


 Quetiapine


 Fumarate


  (SEROquel)  100 mg  Q12HR


 ORAL


   2/23/19 21:00


 3/25/19 20:59  2/28/19 10:14


 


 


 Sertraline HCl


  (Zoloft)  100 mg  DAILY


 ORAL


   2/24/19 09:00


 3/26/19 08:59  2/28/19 10:14


 


 


 Zolpidem Tartrate


  (Ambien)  5 mg  HSPRN  PRN


 ORAL


 Insomnia  2/23/19 19:00


 3/2/19 18:59   


 

















Tao Younger MD Mar 1, 2019 17:22

## 2019-03-01 NOTE — GENERAL PROGRESS NOTE
Assessment/Plan


Problem List:  


(1) UTI (urinary tract infection)


ICD Codes:  N39.0 - Urinary tract infection, site not specified


SNOMED:  16770956


(2) Altered mental status


ICD Codes:  R41.82 - Altered mental status, unspecified


SNOMED:  874260996


(3) Allergic reaction


ICD Codes:  T78.40XA - Allergy, unspecified, initial encounter


SNOMED:  934659589


(4) Anxiety


ICD Codes:  F41.9 - Anxiety disorder, unspecified


SNOMED:  71288864


(5) Schizophrenia


ICD Codes:  F20.9 - Schizophrenia, unspecified


SNOMED:  23718545


(6) Encephalopathy


ICD Codes:  G93.40 - Encephalopathy, unspecified


SNOMED:  20723993


Status:  unchanged


Assessment/Plan


pt diet abx cbc bmp am dc to snf if clear





Subjective


Constitutional:  Reports: weakness


Allergies:  


Coded Allergies:  


     No Known Allergies (Unverified , 2/23/19)


All Systems:  reviewed and negative except above


Subjective


sleepy calm in room confused





Objective





Last 24 Hour Vital Signs








  Date Time  Temp Pulse Resp B/P (MAP) Pulse Ox O2 Delivery O2 Flow Rate FiO2


 


3/1/19 09:27      Room Air  


 


3/1/19 08:00   17     


 


3/1/19 04:00 97.5 74 18 123/85 (98) 99   


 


3/1/19 00:00   17     


 


2/28/19 21:00      Room Air  

















Intake and Output  


 


 2/28/19 3/1/19





 19:00 07:00


 


Intake Total  480 ml


 


Balance  480 ml


 


  


 


Intake Oral  480 ml


 


# Voids  2








Height (Feet):  6


Height (Inches):  0.00


Weight (Pounds):  191


General Appearance:  lethargic


EENT:  normal ENT inspection


Neck:  normal alignment


Cardiovascular:  normal peripheral pulses, normal rate, regular rhythm


Respiratory/Chest:  chest wall non-tender, lungs clear, normal breath sounds


Abdomen:  normal bowel sounds, non tender, soft


Extremities:  normal inspection


Edema:  no edema noted Arm (L), no edema noted Arm (R), no edema noted Leg (L), 

no edema noted Leg (R), no edema noted Pedal (L), no edema noted Pedal (R), no 

edema noted Generalized


Neurologic:  motor weakness


Skin:  normal pigmentation, warm/dry











Campos Lakhani DO Mar 1, 2019 13:11

## 2019-03-01 NOTE — NUR
NURSE NOTES:

Received patient in no apparent distress. A&OX2. Bed in lowest position. Call light within 
reach. Will continue to monitor.

## 2019-03-01 NOTE — NUR
NURSE NOTES:

pt in bed ambulatory. Breathing regular and unlabored. no behavior issue noted at this time. 
safety precaution provided. Bed in lowest position. call light within reach at all time.

## 2019-03-01 NOTE — NUR
CASE MANAGEMENT: REVIEW









SI:  ALOC . DM 

T 97.5 HR 74 RR 18 /85 SAT 99% ROOM AIR







IS: LOPRESSOR PO QD

NOVOLOG SQ AC/HS

HEPARIN SQ Q12HR 







***MED/SURG STATUS***

DCP: PATIENT IS FROM [facility]

## 2019-03-01 NOTE — INFECTIOUS DISEASES PROG NOTE
Assessment/Plan


Assessment/Plan


Abx:


Ceftriaxone x1 2/23





Assessment:


Acute encephalopathy





pyuria/bacteriuria- asymptomatic


  -u/a wbc 5-10, nit neg, leuk +1; ucx >100K CONS (colonizer)





Afebrile


No leukocytosis





L  face ecchimosis- ?trauma


  -CT head: No acute intracranial findings.  Soft tissue swelling in the 

frontal scalp with possible subgaleal hematoma.  Partial opacification of the 

left mastoid air cells, suggesting left 


mastoid effusion.


  -CT facial bones:   Partial fluid opacification of the left mastoid air cells

, suggesting the left mastoid effusion.


 





HTN


DM


COPD


 major depressive disorder


 schizophrenia


anxiety disorder


[facility] 





Plan:


-Continue to monitor off abx


  -ok to discharge from ID perspective


-f/u cx


-Monitor CBC/CMP, temperatures


-aspiration precautions


-discharge planning: awaiting placement





Thank you for this consultation. Will continue to follow along with you.


Discussed with RN.





Subjective


Allergies:  


Coded Allergies:  


     No Known Allergies (Unverified , 2/23/19)


Subjective


afebrile 


off abx





Objective


Vital Signs





Last 24 Hour Vital Signs








  Date Time  Temp Pulse Resp B/P (MAP) Pulse Ox O2 Delivery O2 Flow Rate FiO2


 


3/1/19 16:00   17     


 


3/1/19 12:00   17     


 


3/1/19 09:27      Room Air  


 


3/1/19 08:00   17     


 


3/1/19 04:00 97.5 74 18 123/85 (98) 99   


 


3/1/19 00:00   17     


 


2/28/19 21:00      Room Air  








Height (Feet):  6


Height (Inches):  0.00


Weight (Pounds):  191


Objective


GENERAL:  Slightly confused in bed, sleepy, refusing to answer questions.


CARDIOVASCULAR:  No murmur.


LUNGS:  Poor exchange.


ABDOMEN:  Bowel sounds positive.


EXTREMITIES:  Show no cyanosis or edema.


HEENT:  Left periorbital ecchymosis noted.


NEUROLOGIC:  The patient moves all extremities, slightly weak.





Current Medications








 Medications


  (Trade)  Dose


 Ordered  Sig/Petr


 Route


 PRN Reason  Start Time


 Stop Time Status Last Admin


Dose Admin


 


 Acetaminophen


  (Tylenol)  650 mg  Q4H  PRN


 ORAL


 fever  2/23/19 19:00


 3/25/19 18:59   


 


 


 Al Hydroxide/Mg


 Hydroxide


  (Mylanta II)  30 ml  Q6H  PRN


 ORAL


 dyspepsia  2/23/19 19:00


 3/25/19 18:59   


 


 


 Dextrose


  (Dextrose 50%)  25 ml  Q30M  PRN


 IV


 Hypoglycemia  2/23/19 19:00


 3/25/19 18:59   


 


 


 Dextrose


  (Dextrose 50%)  50 ml  Q30M  PRN


 IV


 Hypoglycemia  2/23/19 19:00


 3/25/19 18:59   


 


 


 Ferrous Sulfate


  (Feosol)  325 mg  DAILY


 ORAL


   2/24/19 09:00


 3/26/19 08:59  2/28/19 10:14


 


 


 Heparin Sodium


  (Porcine)


  (Heparin 5000


 units/ml)  5,000 units  EVERY 12  HOURS


 SUBQ


   2/23/19 21:00


 3/25/19 20:59   


 


 


 Insulin Aspart


  (NovoLOG)    BEFORE MEALS AND  HS


 SUBQ


   2/23/19 21:00


 3/25/19 20:59  2/27/19 12:16


 


 


 Lorazepam


  (Ativan 2mg/ml


 1ml)  0.5 mg  Q4H  PRN


 IV


 For Anxiety  2/23/19 19:00


 3/2/19 18:59  2/26/19 17:10


 


 


 Lorazepam


  (Ativan 2mg/ml


 1ml)  2 mg  Q4H  PRN


 IV


 For Anxiety  2/23/19 19:00


 3/2/19 18:59   


 


 


 Metoprolol


 Tartrate


  (Lopressor)  25 mg  DAILY


 ORAL


   2/24/19 09:00


 3/26/19 08:59   


 


 


 Morphine Sulfate


  (Morphine


 Sulfate)  1 mg  Q4H  PRN


 IVP


 For Pain  2/23/19 19:00


 3/2/19 18:59   


 


 


 Ondansetron HCl


  (Zofran)  4 mg  Q6H  PRN


 IVP


 Nausea & Vomiting  2/23/19 19:00


 3/25/19 18:59   


 


 


 Polyethylene


 Glycol


  (Miralax)  17 gm  HSPRN  PRN


 ORAL


 Constipation  2/23/19 19:00


 3/25/19 18:59   


 


 


 Quetiapine


 Fumarate


  (SEROquel)  100 mg  Q12HR


 ORAL


   2/23/19 21:00


 3/25/19 20:59  2/28/19 10:14


 


 


 Sertraline HCl


  (Zoloft)  100 mg  DAILY


 ORAL


   2/24/19 09:00


 3/26/19 08:59  2/28/19 10:14


 


 


 Zolpidem Tartrate


  (Ambien)  5 mg  HSPRN  PRN


 ORAL


 Insomnia  2/23/19 19:00


 3/2/19 18:59   


 

















Toya Salomon M.D. Mar 1, 2019 17:27

## 2019-03-02 VITALS — DIASTOLIC BLOOD PRESSURE: 85 MMHG | SYSTOLIC BLOOD PRESSURE: 124 MMHG

## 2019-03-02 LAB
ADD MANUAL DIFF: NO
ANION GAP SERPL CALC-SCNC: 7 MMOL/L (ref 5–15)
BASOPHILS NFR BLD AUTO: 1 % (ref 0–2)
BUN SERPL-MCNC: 10 MG/DL (ref 7–18)
CALCIUM SERPL-MCNC: 9 MG/DL (ref 8.5–10.1)
CHLORIDE SERPL-SCNC: 106 MMOL/L (ref 98–107)
CO2 SERPL-SCNC: 29 MMOL/L (ref 21–32)
CREAT SERPL-MCNC: 0.7 MG/DL (ref 0.55–1.3)
EOSINOPHIL NFR BLD AUTO: 2.6 % (ref 0–3)
ERYTHROCYTE [DISTWIDTH] IN BLOOD BY AUTOMATED COUNT: 12.1 % (ref 11.6–14.8)
HCT VFR BLD CALC: 38.9 % (ref 42–52)
HGB BLD-MCNC: 12.9 G/DL (ref 14.2–18)
LYMPHOCYTES NFR BLD AUTO: 38.2 % (ref 20–45)
MCV RBC AUTO: 95 FL (ref 80–99)
MONOCYTES NFR BLD AUTO: 7.8 % (ref 1–10)
NEUTROPHILS NFR BLD AUTO: 50.4 % (ref 45–75)
PLATELET # BLD: 283 K/UL (ref 150–450)
POTASSIUM SERPL-SCNC: 3.7 MMOL/L (ref 3.5–5.1)
RBC # BLD AUTO: 4.1 M/UL (ref 4.7–6.1)
SODIUM SERPL-SCNC: 141 MMOL/L (ref 136–145)
WBC # BLD AUTO: 6.8 K/UL (ref 4.8–10.8)

## 2019-03-02 RX ADMIN — INSULIN ASPART SCH UNITS: 100 INJECTION, SOLUTION INTRAVENOUS; SUBCUTANEOUS at 11:30

## 2019-03-02 RX ADMIN — HEPARIN SODIUM SCH UNITS: 5000 INJECTION INTRAVENOUS; SUBCUTANEOUS at 20:13

## 2019-03-02 RX ADMIN — INSULIN ASPART SCH UNITS: 100 INJECTION, SOLUTION INTRAVENOUS; SUBCUTANEOUS at 16:19

## 2019-03-02 RX ADMIN — METOPROLOL TARTRATE SCH MG: 25 TABLET, FILM COATED ORAL at 09:00

## 2019-03-02 RX ADMIN — INSULIN ASPART SCH UNITS: 100 INJECTION, SOLUTION INTRAVENOUS; SUBCUTANEOUS at 06:30

## 2019-03-02 RX ADMIN — SERTRALINE HYDROCHLORIDE SCH MG: 100 TABLET, FILM COATED ORAL at 09:00

## 2019-03-02 RX ADMIN — INSULIN ASPART SCH UNITS: 100 INJECTION, SOLUTION INTRAVENOUS; SUBCUTANEOUS at 20:13

## 2019-03-02 RX ADMIN — HEPARIN SODIUM SCH UNITS: 5000 INJECTION INTRAVENOUS; SUBCUTANEOUS at 09:00

## 2019-03-02 NOTE — PULMONOLOGY PROGRESS NOTE
Assessment/Plan


Problems:  


(1) Altered mental status


(2) Schizophrenia


(3) Anxiety


(4) Altered level of consciousness


Assessment/Plan


walking in hallway


no new complains


doing better


all reviewed


symptomatic treatment


check electrolytes


fu psych recommendations


dvt prophylaxis


dc planning





Subjective


ROS Limited/Unobtainable:  No


Constitutional:  Reports: no symptoms


Allergies:  


Coded Allergies:  


     No Known Allergies (Unverified , 2/23/19)





Objective





Last 24 Hour Vital Signs








  Date Time  Temp Pulse Resp B/P (MAP) Pulse Ox O2 Delivery O2 Flow Rate FiO2


 


3/2/19 08:00      Room Air  


 


3/2/19 04:00   17     


 


3/2/19 00:00   18     


 


3/1/19 20:58      Room Air  


 


3/1/19 20:00   18     


 


3/1/19 16:00   17     


 


3/1/19 12:00   17     

















Intake and Output  


 


 3/1/19 3/2/19





 19:00 07:00


 


Intake Total 850 ml 800 ml


 


Balance 850 ml 800 ml


 


  


 


Intake Oral  800 ml


 


Other 850 ml 


 


# Voids  3








Objective


General Appearance:  WD/WN


HEENT:  normocephalic


Respiratory/Chest:  chest wall non-tender, lungs clear, normal breath sounds


Breasts:  no masses


Cardiovascular:  normal peripheral pulses, normal rate


Abdomen:  normal bowel sounds, soft, non tender


Extremities:  no cyanosis


Skin:  no rash


Laboratory Tests


3/2/19 05:50: 


White Blood Count 6.8, Red Blood Count 4.10L, Hemoglobin 12.9L, Hematocrit 38.9L

, Mean Corpuscular Volume 95, Mean Corpuscular Hemoglobin 31.4H, Mean 

Corpuscular Hemoglobin Concent 33.1, Red Cell Distribution Width 12.1, Platelet 

Count 283, Mean Platelet Volume 9.4, Neutrophils (%) (Auto) 50.4, Lymphocytes (%

) (Auto) 38.2, Monocytes (%) (Auto) 7.8, Eosinophils (%) (Auto) 2.6, Basophils (

%) (Auto) 1.0, Sodium Level 141, Potassium Level 3.7, Chloride Level 106, 

Carbon Dioxide Level 29, Anion Gap 7, Blood Urea Nitrogen 10, Creatinine 0.7, 

Estimat Glomerular Filtration Rate > 60, Glucose Level 102, Calcium Level 9.0





Current Medications








 Medications


  (Trade)  Dose


 Ordered  Sig/Petr


 Route


 PRN Reason  Start Time


 Stop Time Status Last Admin


Dose Admin


 


 Acetaminophen


  (Tylenol)  650 mg  Q4H  PRN


 ORAL


 fever  2/23/19 19:00


 3/25/19 18:59   


 


 


 Al Hydroxide/Mg


 Hydroxide


  (Mylanta II)  30 ml  Q6H  PRN


 ORAL


 dyspepsia  2/23/19 19:00


 3/25/19 18:59   


 


 


 Dextrose


  (Dextrose 50%)  25 ml  Q30M  PRN


 IV


 Hypoglycemia  2/23/19 19:00


 3/25/19 18:59   


 


 


 Dextrose


  (Dextrose 50%)  50 ml  Q30M  PRN


 IV


 Hypoglycemia  2/23/19 19:00


 3/25/19 18:59   


 


 


 Ferrous Sulfate


  (Feosol)  325 mg  DAILY


 ORAL


   2/24/19 09:00


 3/26/19 08:59  2/28/19 10:14


 


 


 Heparin Sodium


  (Porcine)


  (Heparin 5000


 units/ml)  5,000 units  EVERY 12  HOURS


 SUBQ


   2/23/19 21:00


 3/25/19 20:59   


 


 


 Insulin Aspart


  (NovoLOG)    BEFORE MEALS AND  HS


 SUBQ


   2/23/19 21:00


 3/25/19 20:59  2/27/19 12:16


 


 


 Lorazepam


  (Ativan 2mg/ml


 1ml)  0.5 mg  Q4H  PRN


 IV


 For Anxiety  2/23/19 19:00


 3/2/19 18:59  2/26/19 17:10


 


 


 Lorazepam


  (Ativan 2mg/ml


 1ml)  2 mg  Q4H  PRN


 IV


 For Anxiety  2/23/19 19:00


 3/2/19 18:59   


 


 


 Metoprolol


 Tartrate


  (Lopressor)  25 mg  DAILY


 ORAL


   2/24/19 09:00


 3/26/19 08:59   


 


 


 Morphine Sulfate


  (Morphine


 Sulfate)  1 mg  Q4H  PRN


 IVP


 For Pain  2/23/19 19:00


 3/2/19 18:59   


 


 


 Ondansetron HCl


  (Zofran)  4 mg  Q6H  PRN


 IVP


 Nausea & Vomiting  2/23/19 19:00


 3/25/19 18:59   


 


 


 Polyethylene


 Glycol


  (Miralax)  17 gm  HSPRN  PRN


 ORAL


 Constipation  2/23/19 19:00


 3/25/19 18:59   


 


 


 Quetiapine


 Fumarate


  (SEROquel)  100 mg  Q12HR


 ORAL


   2/23/19 21:00


 3/25/19 20:59  2/28/19 10:14


 


 


 Sertraline HCl


  (Zoloft)  100 mg  DAILY


 ORAL


   2/24/19 09:00


 3/26/19 08:59  2/28/19 10:14


 


 


 Zolpidem Tartrate


  (Ambien)  5 mg  HSPRN  PRN


 ORAL


 Insomnia  2/23/19 19:00


 3/2/19 18:59   


 

















Tao Younger MD Mar 2, 2019 11:59

## 2019-03-02 NOTE — NUR
HAND-OFF: 

NURSE NOTES:

Received patient from L[facility] BRODERICK, patient  is sleeping in bed, no distress noted, bed is 
locked and in lowest position, call light within reach, will continue to monitor.

## 2019-03-02 NOTE — NUR
NURSE NOTES:

Received patient walking on the hallway. A/O x 2, confused. On RA, no SOB, no acute 
distress, denies pain. Patient walking in stable gait, wondering around. Redirected back to 
bed, pt refused. No IV site, MD aware. Bed in lowest position, locked, alarms on. Call light 
in reach. Will cont monitor closely.

## 2019-03-02 NOTE — CARDIOLOGY REPORT
--------------- APPROVED REPORT --------------





EKG Measurement

Heart [id number]

[id number]

[id number]

[id number]

[id number]





Normal sinus rhythm

Right bundle branch block

Abnormal ECG

## 2019-03-02 NOTE — GENERAL PROGRESS NOTE
Assessment/Plan


Problem List:  


(1) UTI (urinary tract infection)


ICD Codes:  N39.0 - Urinary tract infection, site not specified


SNOMED:  84973705


(2) Altered mental status


ICD Codes:  R41.82 - Altered mental status, unspecified


SNOMED:  457982780


(3) Allergic reaction


ICD Codes:  T78.40XA - Allergy, unspecified, initial encounter


SNOMED:  188431563


(4) Anxiety


ICD Codes:  F41.9 - Anxiety disorder, unspecified


SNOMED:  52132783


(5) Schizophrenia


ICD Codes:  F20.9 - Schizophrenia, unspecified


SNOMED:  04584123


(6) Encephalopathy


ICD Codes:  G93.40 - Encephalopathy, unspecified


SNOMED:  92441184


Status:  unchanged


Assessment/Plan


pt diet abx cbc bmp am dc to snf if clear





Subjective


Constitutional:  Reports: weakness


Allergies:  


Coded Allergies:  


     No Known Allergies (Unverified , 2/23/19)


All Systems:  reviewed and negative except above


Subjective


sleepy calm in room confused





Objective





Last 24 Hour Vital Signs








  Date Time  Temp Pulse Resp B/P (MAP) Pulse Ox O2 Delivery O2 Flow Rate FiO2


 


3/2/19 04:00   17     


 


3/2/19 00:00   18     


 


3/1/19 20:58      Room Air  


 


3/1/19 20:00   18     


 


3/1/19 16:00   17     


 


3/1/19 12:00   17     


 


3/1/19 09:27      Room Air  

















Intake and Output  


 


 3/1/19 3/2/19





 19:00 07:00


 


Intake Total 850 ml 800 ml


 


Balance 850 ml 800 ml


 


  


 


Intake Oral  800 ml


 


Other 850 ml 


 


# Voids  3








Laboratory Tests


3/2/19 05:50: 


White Blood Count 6.8, Red Blood Count 4.10L, Hemoglobin 12.9L, Hematocrit 38.9L

, Mean Corpuscular Volume 95, Mean Corpuscular Hemoglobin 31.4H, Mean 

Corpuscular Hemoglobin Concent 33.1, Red Cell Distribution Width 12.1, Platelet 

Count 283, Mean Platelet Volume 9.4, Neutrophils (%) (Auto) 50.4, Lymphocytes (%

) (Auto) 38.2, Monocytes (%) (Auto) 7.8, Eosinophils (%) (Auto) 2.6, Basophils (

%) (Auto) 1.0, Sodium Level 141, Potassium Level 3.7, Chloride Level 106, 

Carbon Dioxide Level 29, Anion Gap 7, Blood Urea Nitrogen 10, Creatinine 0.7, 

Estimat Glomerular Filtration Rate > 60, Glucose Level 102, Calcium Level 9.0


Height (Feet):  6


Height (Inches):  0.00


Weight (Pounds):  191


General Appearance:  lethargic, confused


EENT:  normal ENT inspection


Neck:  normal alignment


Cardiovascular:  normal peripheral pulses, normal rate, regular rhythm


Respiratory/Chest:  chest wall non-tender, lungs clear, normal breath sounds


Abdomen:  normal bowel sounds, non tender, soft


Extremities:  normal inspection


Edema:  no edema noted Arm (L), no edema noted Arm (R), no edema noted Leg (L), 

no edema noted Leg (R), no edema noted Pedal (L), no edema noted Pedal (R), no 

edema noted Generalized


Neurologic:  motor weakness


Skin:  normal pigmentation, warm/dry











Campos Lakhani DO Mar 2, 2019 09:11

## 2019-03-02 NOTE — INFECTIOUS DISEASES PROG NOTE
Assessment/Plan


Assessment/Plan





Assessment:


Acute encephalopathy





pyuria/bacteriuria- asymptomatic


  -u/a wbc 5-10, nit neg, leuk +1; ucx >100K CONS (colonizer)





Afebrile


No leukocytosis





L  face ecchimosis- ?trauma


  -CT head: No acute intracranial findings.  Soft tissue swelling in the 

frontal scalp with possible subgaleal hematoma.  Partial opacification of the 

left mastoid air cells, suggesting left 


mastoid effusion.


  -CT facial bones:   Partial fluid opacification of the left mastoid air cells

, suggesting the left mastoid effusion.


 





HTN


DM


COPD


 major depressive disorder


 schizophrenia


anxiety disorder


[facility] 





Plan:


-Continue to monitor off abx


  -ok to discharge from ID perspective


-Monitor CBC/CMP, temperatures


-aspiration precautions


-discharge planning: awaiting placement





Subjective


Allergies:  


Coded Allergies:  


     No Known Allergies (Unverified , 2/23/19)


Subjective








afebrile 


no acute process





Objective


Vital Signs





Last 24 Hour Vital Signs








  Date Time  Temp Pulse Resp B/P (MAP) Pulse Ox O2 Delivery O2 Flow Rate FiO2


 


3/2/19 08:00      Room Air  


 


3/2/19 04:00   17     


 


3/2/19 00:00   18     


 


3/1/19 20:58      Room Air  


 


3/1/19 20:00   18     


 


3/1/19 16:00   17     








Height (Feet):  6


Height (Inches):  0.00


Weight (Pounds):  191


HEENT:  atraumatic


Respiratory/Chest:  normal breath sounds


Cardiovascular:  regular rhythm


Abdomen:  non distended





Laboratory Tests








Test


  3/2/19


05:50


 


White Blood Count


  6.8 K/UL


(4.8-10.8)


 


Red Blood Count


  4.10 M/UL


(4.70-6.10)  L


 


Hemoglobin


  12.9 G/DL


(14.2-18.0)  L


 


Hematocrit


  38.9 %


(42.0-52.0)  L


 


Mean Corpuscular Volume 95 FL (80-99)  


 


Mean Corpuscular Hemoglobin


  31.4 PG


(27.0-31.0)  H


 


Mean Corpuscular Hemoglobin


Concent 33.1 G/DL


(32.0-36.0)


 


Red Cell Distribution Width


  12.1 %


(11.6-14.8)


 


Platelet Count


  283 K/UL


(150-450)


 


Mean Platelet Volume


  9.4 FL


(6.5-10.1)


 


Neutrophils (%) (Auto)


  50.4 %


(45.0-75.0)


 


Lymphocytes (%) (Auto)


  38.2 %


(20.0-45.0)


 


Monocytes (%) (Auto)


  7.8 %


(1.0-10.0)


 


Eosinophils (%) (Auto)


  2.6 %


(0.0-3.0)


 


Basophils (%) (Auto)


  1.0 %


(0.0-2.0)


 


Sodium Level


  141 MMOL/L


(136-145)


 


Potassium Level


  3.7 MMOL/L


(3.5-5.1)


 


Chloride Level


  106 MMOL/L


()


 


Carbon Dioxide Level


  29 MMOL/L


(21-32)


 


Anion Gap


  7 mmol/L


(5-15)


 


Blood Urea Nitrogen


  10 mg/dL


(7-18)


 


Creatinine


  0.7 MG/DL


(0.55-1.30)


 


Estimat Glomerular Filtration


Rate > 60 mL/min


(>60)


 


Glucose Level


  102 MG/DL


()


 


Calcium Level


  9.0 MG/DL


(8.5-10.1)











Current Medications








 Medications


  (Trade)  Dose


 Ordered  Sig/Petr


 Route


 PRN Reason  Start Time


 Stop Time Status Last Admin


Dose Admin


 


 Acetaminophen


  (Tylenol)  650 mg  Q4H  PRN


 ORAL


 fever  2/23/19 19:00


 3/25/19 18:59   


 


 


 Al Hydroxide/Mg


 Hydroxide


  (Mylanta II)  30 ml  Q6H  PRN


 ORAL


 dyspepsia  2/23/19 19:00


 3/25/19 18:59   


 


 


 Dextrose


  (Dextrose 50%)  25 ml  Q30M  PRN


 IV


 Hypoglycemia  2/23/19 19:00


 3/25/19 18:59   


 


 


 Dextrose


  (Dextrose 50%)  50 ml  Q30M  PRN


 IV


 Hypoglycemia  2/23/19 19:00


 3/25/19 18:59   


 


 


 Ferrous Sulfate


  (Feosol)  325 mg  DAILY


 ORAL


   2/24/19 09:00


 3/26/19 08:59  2/28/19 10:14


 


 


 Heparin Sodium


  (Porcine)


  (Heparin 5000


 units/ml)  5,000 units  EVERY 12  HOURS


 SUBQ


   2/23/19 21:00


 3/25/19 20:59   


 


 


 Insulin Aspart


  (NovoLOG)    BEFORE MEALS AND  HS


 SUBQ


   2/23/19 21:00


 3/25/19 20:59  2/27/19 12:16


 


 


 Lorazepam


  (Ativan 2mg/ml


 1ml)  0.5 mg  Q4H  PRN


 IV


 For Anxiety  2/23/19 19:00


 3/2/19 18:59  2/26/19 17:10


 


 


 Lorazepam


  (Ativan 2mg/ml


 1ml)  2 mg  Q4H  PRN


 IV


 For Anxiety  2/23/19 19:00


 3/2/19 18:59   


 


 


 Metoprolol


 Tartrate


  (Lopressor)  25 mg  DAILY


 ORAL


   2/24/19 09:00


 3/26/19 08:59   


 


 


 Morphine Sulfate


  (Morphine


 Sulfate)  1 mg  Q4H  PRN


 IVP


 For Pain  2/23/19 19:00


 3/2/19 18:59   


 


 


 Ondansetron HCl


  (Zofran)  4 mg  Q6H  PRN


 IVP


 Nausea & Vomiting  2/23/19 19:00


 3/25/19 18:59   


 


 


 Polyethylene


 Glycol


  (Miralax)  17 gm  HSPRN  PRN


 ORAL


 Constipation  2/23/19 19:00


 3/25/19 18:59   


 


 


 Quetiapine


 Fumarate


  (SEROquel)  100 mg  Q12HR


 ORAL


   2/23/19 21:00


 3/25/19 20:59  2/28/19 10:14


 


 


 Sertraline HCl


  (Zoloft)  100 mg  DAILY


 ORAL


   2/24/19 09:00


 3/26/19 08:59  2/28/19 10:14


 


 


 Zolpidem Tartrate


  (Ambien)  5 mg  HSPRN  PRN


 ORAL


 Insomnia  2/23/19 19:00


 3/2/19 18:59   


 

















Jimmy Osborn MD Mar 2, 2019 13:13

## 2019-03-02 NOTE — PROGRESS NOTE
DATE:  03/02/2019

SUBJECTIVE:  This is a 59-year-old male patient with encephalopathy.  He

has some confusion, some disorganized thought process, and mood lability

worsened by stress of his medical illness.  That is why, his attending has

requested daily psychiatric consultation.  He is still agitated,

irritable, and extremely mood labile.



MENTAL STATUS EXAMINATION:  He is a 59-year-old male.  Appearance is

disheveled.  Attitude, irritable and agitated.  Affect, guarded and

restricted.  Intellect poor.  Mood, depressed and anxious.  Motor

activity, psychomotor agitation.  Attention span is poor.  Orientation x2.

Speech is low volume and slurred.  Thought process, disorganized and

illogical.  Insight and judgment is poor.



DIAGNOSIS:  Paranoid schizophrenia.



PLAN:  I am going to continue titrating up on his medications to reduce

agitation and stabilize his mood.  Seen and assessed at bedside.  Chart

was reviewed.  Discussed with staff.  Seen and assessed in his room.

Twenty minutes of reality-based supportive psychotherapy provided.









  ______________________________________________

  Mili Madrigal M.D.





DR:  Charly

D:  03/02/2019 09:01

T:  03/02/2019 19:17

JOB#:  [id number]

CC:

## 2019-03-03 RX ADMIN — METOPROLOL TARTRATE SCH MG: 25 TABLET, FILM COATED ORAL at 08:10

## 2019-03-03 RX ADMIN — SERTRALINE HYDROCHLORIDE SCH MG: 100 TABLET, FILM COATED ORAL at 08:11

## 2019-03-03 RX ADMIN — INSULIN ASPART SCH UNITS: 100 INJECTION, SOLUTION INTRAVENOUS; SUBCUTANEOUS at 20:52

## 2019-03-03 RX ADMIN — INSULIN ASPART SCH UNITS: 100 INJECTION, SOLUTION INTRAVENOUS; SUBCUTANEOUS at 11:05

## 2019-03-03 RX ADMIN — INSULIN ASPART SCH UNITS: 100 INJECTION, SOLUTION INTRAVENOUS; SUBCUTANEOUS at 05:35

## 2019-03-03 RX ADMIN — INSULIN ASPART SCH UNITS: 100 INJECTION, SOLUTION INTRAVENOUS; SUBCUTANEOUS at 16:30

## 2019-03-03 RX ADMIN — HEPARIN SODIUM SCH UNITS: 5000 INJECTION INTRAVENOUS; SUBCUTANEOUS at 20:52

## 2019-03-03 RX ADMIN — HEPARIN SODIUM SCH UNITS: 5000 INJECTION INTRAVENOUS; SUBCUTANEOUS at 08:11

## 2019-03-03 NOTE — NUR
RD ASSESSMENT & RECOMMENDATIONS

SEE CARE ACTIVITY FOR COMPLETE ASSESSMENT



DAILY ESTIMATED NEEDS:

Needs based on general 82kg adj  

25-30  kcals/kg 

8235-3633  total kcals

.8-1  g protein/kg

66-82  g total protein 

25-30  mL/kg

4341-0312  total fluid mLs





NUTRITION DIAGNOSIS:

no nutritional diagnosis at this time



CURRENT DIET: CCHO MED     





PO DIET RECOMMENDATIONS:

Regular diet / texture as tolerated  





ADDITIONAL RECOMMENDATIONS:

1) Obtain a standing weight as able  

2) On [facility] diet-> rec HgA1C for eval

## 2019-03-03 NOTE — NUR
NURSE NOTES:

Received patient from Sandra BRODERICK, patient is sleeping in bed, no distress noted, bed is 
locked and in lowest position, call light within reach, no distress noted, will continue to 
monitor.

## 2019-03-03 NOTE — PROGRESS NOTE
DATE:  03/03/2019

SUBJECTIVE:  The patient is a 59-year-old male patient with encephalopathy.

The patient continues to have some depression, confusion, and mood

lability.  He has got no logical plan for his own self-care.  Still

agitated, irritable, _____.



MENTAL STATUS EXAMINATION:  The patient is a 59-year-old male.  Appearance

is disheveled.  Attitude, irritable and agitated.  Affect, guarded and

restricted.  Intellect poor.  Mood, depressed and anxious.  Motor

activity, psychomotor agitation.  Attention span is poor.  Orientation x2.

Speech is pressured.  Thought process, disorganized and illogical.

Thought content, auditory hallucinations and paranoid delusions.  Insight

and judgment are poor.



DIAGNOSIS:  Paranoid schizophrenia with acute exacerbation.



PLAN:  To treat him on Seroquel to stabilize his mood.  Provided 20 minutes

of cognitive behavioral therapy to help him identify automatic negative

thoughts _____ more positive thoughts to reduce depression, anxiety, and

mood lability.  A 20 minutes of cognitive behavioral therapy provided.

Chart was reviewed.  Discussed with staff.









  ______________________________________________

  Mili Madrigal M.D.





DR:  SUSAN

D:  03/03/2019 04:13

T:  03/03/2019 09:37

JOB#:  [id number]

CC:

## 2019-03-03 NOTE — PULMONOLOGY PROGRESS NOTE
Assessment/Plan


Problems:  


(1) Altered mental status


(2) Schizophrenia


(3) Anxiety


(4) Altered level of consciousness


Assessment/Plan


wants to get discharged


no new complains


doing better


all reviewed


symptomatic treatment


check electrolytes


fu psych recommendations


dvt prophylaxis


dc planning





Subjective


ROS Limited/Unobtainable:  No


Constitutional:  Reports: no symptoms


HEENT:  Repors: no symptoms


Respiratory:  Reports: no symptoms


Allergies:  


Coded Allergies:  


     No Known Allergies (Unverified , 2/23/19)





Objective





Last 24 Hour Vital Signs








  Date Time  Temp Pulse Resp B/P (MAP) Pulse Ox O2 Delivery O2 Flow Rate FiO2


 


3/3/19 08:00      Room Air  


 


3/2/19 21:00      Room Air  


 


3/2/19 20:00 98.2 66 17 124/85 (98) 99   

















Intake and Output  


 


 3/2/19 3/3/19





 19:00 07:00


 


Intake Total 750 ml 600 ml


 


Output Total  800 ml


 


Balance 750 ml -200 ml


 


  


 


Intake Oral 750 ml 600 ml


 


Output Urine Total  800 ml


 


# Voids 2 3








Objective


General Appearance:  WD/WN


HEENT:  normocephalic


Respiratory/Chest:  chest wall non-tender, lungs clear, normal breath sounds


Breasts:  no masses


Cardiovascular:  normal peripheral pulses, normal rate


Abdomen:  normal bowel sounds, soft, non tender


Extremities:  no cyanosis


Skin:  no rash





Current Medications








 Medications


  (Trade)  Dose


 Ordered  Sig/Petr


 Route


 PRN Reason  Start Time


 Stop Time Status Last Admin


Dose Admin


 


 Acetaminophen


  (Tylenol)  650 mg  Q4H  PRN


 ORAL


 fever  2/23/19 19:00


 3/25/19 18:59  3/3/19 06:28


 


 


 Al Hydroxide/Mg


 Hydroxide


  (Mylanta II)  30 ml  Q6H  PRN


 ORAL


 dyspepsia  2/23/19 19:00


 3/25/19 18:59   


 


 


 Dextrose


  (Dextrose 50%)  25 ml  Q30M  PRN


 IV


 Hypoglycemia  2/23/19 19:00


 3/25/19 18:59   


 


 


 Dextrose


  (Dextrose 50%)  50 ml  Q30M  PRN


 IV


 Hypoglycemia  2/23/19 19:00


 3/25/19 18:59   


 


 


 Ferrous Sulfate


  (Feosol)  325 mg  DAILY


 ORAL


   2/24/19 09:00


 3/26/19 08:59  2/28/19 10:14


 


 


 Heparin Sodium


  (Porcine)


  (Heparin 5000


 units/ml)  5,000 units  EVERY 12  HOURS


 SUBQ


   2/23/19 21:00


 3/25/19 20:59   


 


 


 Insulin Aspart


  (NovoLOG)    BEFORE MEALS AND  HS


 SUBQ


   2/23/19 21:00


 3/25/19 20:59  2/27/19 12:16


 


 


 Metoprolol


 Tartrate


  (Lopressor)  25 mg  DAILY


 ORAL


   2/24/19 09:00


 3/26/19 08:59   


 


 


 Ondansetron HCl


  (Zofran)  4 mg  Q6H  PRN


 IVP


 Nausea & Vomiting  2/23/19 19:00


 3/25/19 18:59   


 


 


 Polyethylene


 Glycol


  (Miralax)  17 gm  HSPRN  PRN


 ORAL


 Constipation  2/23/19 19:00


 3/25/19 18:59   


 


 


 Quetiapine


 Fumarate


  (SEROquel)  100 mg  Q12HR


 ORAL


   2/23/19 21:00


 3/25/19 20:59  2/28/19 10:14


 


 


 Sertraline HCl


  (Zoloft)  100 mg  DAILY


 ORAL


   2/24/19 09:00


 3/26/19 08:59  2/28/19 10:14


 

















Tao Younger MD Mar 3, 2019 16:15

## 2019-03-03 NOTE — GENERAL PROGRESS NOTE
Assessment/Plan


Problem List:  


(1) UTI (urinary tract infection)


ICD Codes:  N39.0 - Urinary tract infection, site not specified


SNOMED:  17329245


(2) Altered mental status


ICD Codes:  R41.82 - Altered mental status, unspecified


SNOMED:  999338630


(3) Allergic reaction


ICD Codes:  T78.40XA - Allergy, unspecified, initial encounter


SNOMED:  351051741


(4) Anxiety


ICD Codes:  F41.9 - Anxiety disorder, unspecified


SNOMED:  13043804


(5) Schizophrenia


ICD Codes:  F20.9 - Schizophrenia, unspecified


SNOMED:  45994202


(6) Encephalopathy


ICD Codes:  G93.40 - Encephalopathy, unspecified


SNOMED:  99263460


Status:  unchanged


Assessment/Plan


pt diet abx cbc bmp am dc to snf if clear





Subjective


Constitutional:  Reports: weakness


Allergies:  


Coded Allergies:  


     No Known Allergies (Unverified , 2/23/19)


All Systems:  reviewed and negative except above


Subjective


sleepy calm in room confused





Objective





Last 24 Hour Vital Signs








  Date Time  Temp Pulse Resp B/P (MAP) Pulse Ox O2 Delivery O2 Flow Rate FiO2


 


3/2/19 21:00      Room Air  


 


3/2/19 20:00 98.2 66 17 124/85 (98) 99   

















Intake and Output  


 


 3/2/19 3/3/19





 18:59 06:59


 


Intake Total 750 ml 600 ml


 


Output Total  800 ml


 


Balance 750 ml -200 ml


 


  


 


Intake Oral 750 ml 600 ml


 


Output Urine Total  800 ml


 


# Voids 2 3








Height (Feet):  6


Height (Inches):  0.00


Weight (Pounds):  191


General Appearance:  lethargic, confused


EENT:  normal ENT inspection


Neck:  normal alignment


Cardiovascular:  normal peripheral pulses, normal rate, regular rhythm


Respiratory/Chest:  chest wall non-tender, lungs clear, normal breath sounds


Abdomen:  normal bowel sounds, non tender, soft


Extremities:  normal inspection


Edema:  no edema noted Arm (L), no edema noted Arm (R), no edema noted Leg (L), 

no edema noted Leg (R), no edema noted Pedal (L), no edema noted Pedal (R), no 

edema noted Generalized


Neurologic:  motor weakness


Skin:  normal pigmentation, warm/dry











Campos Lakhani DO Mar 3, 2019 08:34

## 2019-03-04 RX ADMIN — HEPARIN SODIUM SCH UNITS: 5000 INJECTION INTRAVENOUS; SUBCUTANEOUS at 20:56

## 2019-03-04 RX ADMIN — METOPROLOL TARTRATE SCH MG: 25 TABLET, FILM COATED ORAL at 09:00

## 2019-03-04 RX ADMIN — INSULIN ASPART SCH UNITS: 100 INJECTION, SOLUTION INTRAVENOUS; SUBCUTANEOUS at 16:30

## 2019-03-04 RX ADMIN — HEPARIN SODIUM SCH UNITS: 5000 INJECTION INTRAVENOUS; SUBCUTANEOUS at 09:00

## 2019-03-04 RX ADMIN — INSULIN ASPART SCH UNITS: 100 INJECTION, SOLUTION INTRAVENOUS; SUBCUTANEOUS at 06:17

## 2019-03-04 RX ADMIN — SERTRALINE HYDROCHLORIDE SCH MG: 100 TABLET, FILM COATED ORAL at 09:00

## 2019-03-04 RX ADMIN — INSULIN ASPART SCH UNITS: 100 INJECTION, SOLUTION INTRAVENOUS; SUBCUTANEOUS at 11:30

## 2019-03-04 RX ADMIN — INSULIN ASPART SCH UNITS: 100 INJECTION, SOLUTION INTRAVENOUS; SUBCUTANEOUS at 20:56

## 2019-03-04 NOTE — INFECTIOUS DISEASES PROG NOTE
Assessment/Plan


Assessment/Plan





Assessment:


Acute encephalopathy





pyuria/bacteriuria- asymptomatic


  -u/a wbc 5-10, nit neg, leuk +1; ucx >100K CONS (colonizer)





Afebrile


No leukocytosis





L  face ecchimosis- ?trauma


  -CT head: No acute intracranial findings.  Soft tissue swelling in the 

frontal scalp with possible subgaleal hematoma.  Partial opacification of the 

left mastoid air cells, suggesting left 


mastoid effusion.


  -CT facial bones:   Partial fluid opacification of the left mastoid air cells

, suggesting the left mastoid effusion.


 





HTN


DM


COPD


 major depressive disorder


 schizophrenia


anxiety disorder


[facility] 





Plan:


-Continue to monitor off abx


  -ok to discharge from ID perspective


  -2/23 SP Ceftriaxone x1





-Monitor CBC/CMP, temperatures


-aspiration precautions


-discharge planning: awaiting placement





Subjective


Allergies:  


Coded Allergies:  


     No Known Allergies (Unverified , 2/23/19)


Subjective


afebrile 


off abx





Objective


Vital Signs





Last 24 Hour Vital Signs








  Date Time  Temp Pulse Resp B/P (MAP) Pulse Ox O2 Delivery O2 Flow Rate FiO2


 


3/4/19 09:00      Room Air  


 


3/3/19 21:00      Room Air  








Height (Feet):  6


Height (Inches):  0.00


Weight (Pounds):  191


Objective


GENERAL:  Slightly confused in bed, sleepy, refusing to answer questions.


CARDIOVASCULAR:  No murmur.


LUNGS:  Poor exchange.


ABDOMEN:  Bowel sounds positive.


EXTREMITIES:  Show no cyanosis or edema.


HEENT:  Left periorbital ecchymosis noted.


NEUROLOGIC:  The patient moves all extremities, slightly weak.





Current Medications








 Medications


  (Trade)  Dose


 Ordered  Sig/Petr


 Route


 PRN Reason  Start Time


 Stop Time Status Last Admin


Dose Admin


 


 Acetaminophen


  (Tylenol)  650 mg  Q4H  PRN


 ORAL


 fever  2/23/19 19:00


 3/25/19 18:59  3/3/19 06:28


 


 


 Al Hydroxide/Mg


 Hydroxide


  (Mylanta II)  30 ml  Q6H  PRN


 ORAL


 dyspepsia  2/23/19 19:00


 3/25/19 18:59   


 


 


 Dextrose


  (Dextrose 50%)  25 ml  Q30M  PRN


 IV


 Hypoglycemia  2/23/19 19:00


 3/25/19 18:59   


 


 


 Dextrose


  (Dextrose 50%)  50 ml  Q30M  PRN


 IV


 Hypoglycemia  2/23/19 19:00


 3/25/19 18:59   


 


 


 Ferrous Sulfate


  (Feosol)  325 mg  DAILY


 ORAL


   2/24/19 09:00


 3/26/19 08:59  2/28/19 10:14


 


 


 Heparin Sodium


  (Porcine)


  (Heparin 5000


 units/ml)  5,000 units  EVERY 12  HOURS


 SUBQ


   2/23/19 21:00


 3/25/19 20:59   


 


 


 Insulin Aspart


  (NovoLOG)    BEFORE MEALS AND  HS


 SUBQ


   2/23/19 21:00


 3/25/19 20:59  2/27/19 12:16


 


 


 Metoprolol


 Tartrate


  (Lopressor)  25 mg  DAILY


 ORAL


   2/24/19 09:00


 3/26/19 08:59   


 


 


 Ondansetron HCl


  (Zofran)  4 mg  Q6H  PRN


 IVP


 Nausea & Vomiting  2/23/19 19:00


 3/25/19 18:59   


 


 


 Polyethylene


 Glycol


  (Miralax)  17 gm  HSPRN  PRN


 ORAL


 Constipation  2/23/19 19:00


 3/25/19 18:59   


 


 


 Quetiapine


 Fumarate


  (SEROquel)  200 mg  Q12HR


 ORAL


   3/4/19 09:00


 3/25/19 20:59   


 


 


 Sertraline HCl


  (Zoloft)  100 mg  DAILY


 ORAL


   2/24/19 09:00


 3/26/19 08:59  2/28/19 10:14


 

















Toya Salomon M.D. Mar 4, 2019 16:46

## 2019-03-04 NOTE — PROGRESS NOTE
DATE:  02/28/2019

NOTE:  POOR AUDIO



PSYCHOTHERAPY CONSULTATION PROGRESS NOTE



TREATING ATTENDING PHYSICIAN:  Campos Lakhani D.O.



SUBJECTIVE:  The patient is a 59-year-old male patient from [facility].  The patient is referred for psychotherapeutic

services, _____00:33 diagnosis is paranoid schizophrenia.  The patient

today remains agitated and irritable.  He states that he is in the

hospital.  He is very anxious.  He has had increased  ______  thoughts

disorganized, _____.  He communicates on 1-to-1 basis;, however, mostly

very guarded and slightly paranoid.



MENTAL STATUS EXAMINATION:  Alert and oriented to person and place.  His

mood is irritable.  Affect blunted.  Thought process is disorganized.

Poor attention and concentration.  Poor insight, judgment, and impulse

control.



DIAGNOSIS:  _____01:01.



PLAN:  This clinician assessed this patient.  Provided the patient with,



1. Reality orientation, which is focused on improving cognitive function

in the individual who is confused and disoriented.  Orientation to

patient, person, place, time, and situation.

2. Provide the patient with supportive psychotherapy, which _____

focused on identifying  positive emotions of stress and positive thoughts

of feeling agitation, irritability, and ______ the patient is able to

verbalize his thought.

3. Provide the patient with cognitive behavioral therapy focused on

_____  problems and emotional ability, addressing the patient's  cognitive

distortions and agitation.  Discussing his restlessness and _____

discussing thoughts of _____ possible communication skills, so he is able

to articulate his thoughts _____.  Working on increasing his compliance

with his treatment and providing him with psychoeducation and his mental

symptoms.  Continue with behavioral management for the patient.  This

clinician has reviewed the patient's chart.  Discussed treatment with

treatment team.









  ______________________________________________

  Shalini Coyne PsyD.





DR:  DOMINICK

D:  03/01/2019 14:17

T:  03/01/2019 23:21

JOB#:  [id number]

CC:

## 2019-03-04 NOTE — NUR
NURSE NOTES:

Patient refused his lunch tray. Stated he did want it and did not want to eat. Attempts to 
leave tray were rebuffed by the patient.

## 2019-03-04 NOTE — PROGRESS NOTE
DATE:  03/04/2019

SUBJECTIVE:  This is a 59-year-old male patient with encephalopathy.  He is

confused, disorganized.  He has got no logical plan for his own self-care.

He is very irritable, agitated.



DIAGNOSIS:  Paranoid schizophrenia.



PLAN:  Titrating up on his Seroquel _____ mg three times a day and then

also continue _____.   Provided him with 20 minutes of reality-based

supportive psychotherapy.  Encouraged _____ with staff and other patients.

A 20 minutes of cognitive behavioral therapy provided to help him

identify his automatic negative thoughts and help him to convert those

negative thoughts to more positive thoughts to reduce depression, anxiety,

and mood lability.  Chart was reviewed.  Discussed with staff.  Seen and

assessed in his room.









  ______________________________________________

  Mili Madrigal M.D.





DR:  SAMIR

D:  03/04/2019 05:28

T:  03/04/2019 17:21

JOB#:  [id number]

CC:

## 2019-03-04 NOTE — GENERAL PROGRESS NOTE
Assessment/Plan


Problem List:  


(1) UTI (urinary tract infection)


ICD Codes:  N39.0 - Urinary tract infection, site not specified


SNOMED:  54525454


(2) Altered mental status


ICD Codes:  R41.82 - Altered mental status, unspecified


SNOMED:  230399541


(3) Allergic reaction


ICD Codes:  T78.40XA - Allergy, unspecified, initial encounter


SNOMED:  528174578


(4) Anxiety


ICD Codes:  F41.9 - Anxiety disorder, unspecified


SNOMED:  57584446


(5) Schizophrenia


ICD Codes:  F20.9 - Schizophrenia, unspecified


SNOMED:  00669569


(6) Encephalopathy


ICD Codes:  G93.40 - Encephalopathy, unspecified


SNOMED:  93065390


Status:  stable, progressing


Assessment/Plan


pt diet abx cbc bmp am dc to snf if clear





Subjective


Constitutional:  Reports: weakness


Allergies:  


Coded Allergies:  


     No Known Allergies (Unverified , 2/23/19)


All Systems:  reviewed and negative except above


Subjective


sleepy calm in room confused





Objective





Last 24 Hour Vital Signs








  Date Time  Temp Pulse Resp B/P (MAP) Pulse Ox O2 Delivery O2 Flow Rate FiO2


 


3/4/19 09:00      Room Air  


 


3/3/19 21:00      Room Air  

















Intake and Output  


 


 3/3/19 3/4/19





 18:59 06:59


 


Intake Total 250 ml 300 ml


 


Output Total 450 ml 


 


Balance -200 ml 300 ml


 


  


 


Intake Oral 250 ml 300 ml


 


Output Urine Total 450 ml 


 


# Voids  3








Height (Feet):  6


Height (Inches):  0.00


Weight (Pounds):  191


General Appearance:  lethargic, confused


EENT:  normal ENT inspection


Neck:  normal alignment


Cardiovascular:  normal peripheral pulses, normal rate, regular rhythm


Respiratory/Chest:  chest wall non-tender, lungs clear, normal breath sounds


Extremities:  normal inspection


Edema:  no edema noted Arm (L), no edema noted Arm (R), no edema noted Leg (L), 

no edema noted Leg (R), no edema noted Pedal (L), no edema noted Pedal (R), no 

edema noted Generalized


Neurologic:  motor weakness


Skin:  normal pigmentation, warm/dry











Campos Lakhani DO Mar 4, 2019 13:12

## 2019-03-04 NOTE — PULMONOLOGY PROGRESS NOTE
Assessment/Plan


Problems:  


(1) Altered mental status


(2) Schizophrenia


(3) Anxiety


(4) Altered level of consciousness


Assessment/Plan


wants to get discharged


no new complains


doing better


all reviewed


symptomatic treatment


check electrolytes


fu psych recommendations


dvt prophylaxis


dc planning





Subjective


Allergies:  


Coded Allergies:  


     No Known Allergies (Unverified , 2/23/19)





Objective





Last 24 Hour Vital Signs








  Date Time  Temp Pulse Resp B/P (MAP) Pulse Ox O2 Delivery O2 Flow Rate FiO2


 


3/4/19 09:00      Room Air  


 


3/3/19 21:00      Room Air  

















Intake and Output  


 


 3/3/19 3/4/19





 18:59 06:59


 


Intake Total 250 ml 300 ml


 


Output Total 450 ml 


 


Balance -200 ml 300 ml


 


  


 


Intake Oral 250 ml 300 ml


 


Output Urine Total 450 ml 


 


# Voids  3








Objective


General Appearance:  WD/WN


HEENT:  normocephalic


Respiratory/Chest:  chest wall non-tender, lungs clear, normal breath sounds


Breasts:  no masses


Cardiovascular:  normal peripheral pulses, normal rate


Abdomen:  normal bowel sounds, soft, non tender


Extremities:  no cyanosis


Skin:  no rash





Current Medications








 Medications


  (Trade)  Dose


 Ordered  Sig/Petr


 Route


 PRN Reason  Start Time


 Stop Time Status Last Admin


Dose Admin


 


 Acetaminophen


  (Tylenol)  650 mg  Q4H  PRN


 ORAL


 fever  2/23/19 19:00


 3/25/19 18:59  3/3/19 06:28


 


 


 Al Hydroxide/Mg


 Hydroxide


  (Mylanta II)  30 ml  Q6H  PRN


 ORAL


 dyspepsia  2/23/19 19:00


 3/25/19 18:59   


 


 


 Dextrose


  (Dextrose 50%)  25 ml  Q30M  PRN


 IV


 Hypoglycemia  2/23/19 19:00


 3/25/19 18:59   


 


 


 Dextrose


  (Dextrose 50%)  50 ml  Q30M  PRN


 IV


 Hypoglycemia  2/23/19 19:00


 3/25/19 18:59   


 


 


 Ferrous Sulfate


  (Feosol)  325 mg  DAILY


 ORAL


   2/24/19 09:00


 3/26/19 08:59  2/28/19 10:14


 


 


 Heparin Sodium


  (Porcine)


  (Heparin 5000


 units/ml)  5,000 units  EVERY 12  HOURS


 SUBQ


   2/23/19 21:00


 3/25/19 20:59   


 


 


 Insulin Aspart


  (NovoLOG)    BEFORE MEALS AND  HS


 SUBQ


   2/23/19 21:00


 3/25/19 20:59  2/27/19 12:16


 


 


 Metoprolol


 Tartrate


  (Lopressor)  25 mg  DAILY


 ORAL


   2/24/19 09:00


 3/26/19 08:59   


 


 


 Ondansetron HCl


  (Zofran)  4 mg  Q6H  PRN


 IVP


 Nausea & Vomiting  2/23/19 19:00


 3/25/19 18:59   


 


 


 Polyethylene


 Glycol


  (Miralax)  17 gm  HSPRN  PRN


 ORAL


 Constipation  2/23/19 19:00


 3/25/19 18:59   


 


 


 Quetiapine


 Fumarate


  (SEROquel)  200 mg  Q12HR


 ORAL


   3/4/19 09:00


 3/25/19 20:59   


 


 


 Sertraline HCl


  (Zoloft)  100 mg  DAILY


 ORAL


   2/24/19 09:00


 3/26/19 08:59  2/28/19 10:14


 

















Tao Younger MD Mar 4, 2019 14:25

## 2019-03-04 NOTE — NUR
NURSE NOTES:

Patient sitting up in chair, no s/s distress noted. Pt refused vital sign, 2100 meds, 
assessment. Explained risks and benefits, still refused. Denies any pain or discomfort. 
Instructed to use call light for assistance. Call light within reach.

## 2019-03-04 NOTE — PROGRESS NOTE
DATE:  03/01/2019

SUBJECTIVE:  This is a 59-year-old male patient with encephalopathy.  The

patient continues to have agitation, irritability, confusion, disorganized

thought process, mood lability.  He is very agitated, irritable.  He has

got no logical plan for his own self-care and extremely racing thoughts,

pressured speech.  He is very paranoid _____  lot of care here in the

hospital.



MENTAL STATUS EXAMINATION:  This is a 59-year-old male.  Appearance is

disheveled.  Attitude, irritable and agitated.  Affect, guarded and

restricted.  Intellect poor.  Mood depressed and anxious.  Motor activity,

psychomotor agitation.  Attention span is poor.  Orientation x2.  Speech

is low volume and slurred.  Thought process, disorganized and illogical.

Insight and judgment is poor.



DIAGNOSIS:  Paranoid schizophrenia with acute exacerbation.



PLAN:  Continue titrating up on Seroquel.  Provide him with 20 minutes of

cognitive behavioral therapy to help him identify his automatic negative

thoughts and help him to convert those negative thoughts to more positive

thoughts to reduce depression, anxiety, mood lability.  Chart reviewed.

Discussed with staff.  Seen and assessed in his room.  A 20 minutes of

cognitive behavioral therapy provided.  Recommend transfer to psych if not

transferred back to SNF.









  ______________________________________________

  Mili Madrigal M.D.





DR:  KIRSTIE

D:  03/01/2019 13:45

T:  03/01/2019 22:22

JOB#:  [id number]

CC:

## 2019-03-04 NOTE — NUR
NURSE NOTES:

Received patient on bed, asleep. No IV access, MD aware. Bed in low and locked position, 
call light in reach. No signs of respiratory distress or pain. Patient stated he does not 
want to be disturbed, refused assessment. Room board updated, will continue to monitor.

## 2019-03-05 NOTE — NUR
2345- RN made houring rounds, patient not in his room. Notified charge nurse and security, 
searched all floors with staff, all rooms, cafeteria but not found. Pt was being admitted 
with psychiatric problems, was refusing all nursing care while he was on the floor. 
Supervisor made aware of the situations. 

2358 - [employer] was called. Security reviewed CCTV camera. Seen patient walking toward 
double door leading to [facility], and this alarm was not working and no camera footage. 

0020 - Called and left message to Dr Lakhani's service call and Dr Lakhani's cell phone and left 
message. Spoke to , notified that patient had eloped. Dr Lakhani has not called back. 
Supervisor made aware.

0025 - Called [employer] for missing pt report.

0100 - [employer] arrived and filed report. Called phone number listed on face sheet, office of 
the public guardian, not answering, only for daytime phone number.

## 2019-03-06 NOTE — DISCHARGE SUMMARY
Discharge Summary


Discharge Summary


_


DATE OF ADMISSION: 2/23/2019





DATE OF DISCHARGE: 3/0/2019








PATIENT ELOPED 





REASON FOR ADMISSION: 


58 years old male with past medical history of hypertension, diabetes mellitus, 

COPD, major depressive disorder, schizophrenia, anxiety disorder resented to 

emergency department for being more confused than usual.  


Upon evaluation vital signs were stable.  


Laboratory workup revealed no leukocytosis, stable hemoglobin  and hematocrit, 

stable electrolytes,  except potassium 3.3.  


Stable renal parameters.  


Glucose 118. 


Troponin negative.  Pro .  EKG revealed normal sinus rhythm , no acute 

ischemic changes.  


Albumin 3.3.  


Lipase 64.  


Chest x-ray revealed no acute cardiopulmonary pathology , but showed bilateral 

mild atelectasis.  


CT of the head revealed no acute intracranial pathology.  Noted soft tissue 

swelling in the frontal scalp with possible subgaleal hematoma.  Partial 

opacification of the left mastoid air cells , suggesting left mastoid effusion.

  


In emergency department patient received empiric antibiotic ,  was given 1 dose 

of Solu-Medrol for possible allergic reaction along with Benadryl.  


Patient received bolus of fluid,  Haldol and was admitted to medical surgical 

floor for further management.


 


CONSULTANTS:


neurologist Dr. Younger


ID specialist Dr. Osborn


psychiatrist Dr. Madrigal


 


hospitals COURSE: 


Patient admitted to medical surgical floor.  


Patient received IV fluid.  


Renal parameters and electrolytes were closely monitored, electrolytes 

corrected as needed.


Nephrotoxins were avoided.


Patient started on empiric antibiotics.  


ID specialist followed.  Urine culture revealed Staph coagulase negative.





Supplemental oxygen provided as needed  to keep pulse oximetry above 92%.


Pulmonary toilet was on standby.


Pulse oximetry was stable on room air.


Chest x-ray was stable.


Pulmonologist doubted any allergic reaction.  


CT of the head revealed no acute intracranial pathology.  


Blood pressure was managed with beta-blocker.


DVT prophylaxis provided.  


Blood sugar was managed with sliding scale of insulin as needed.  


Supportive care provided.  


Pain management was addressed as needed.


Bowel regimen instituted.  





Psychiatrist seen and evaluated patient and diagnosed patient with  paranoid 

schizophrenia.  


Psychiatric medication regimen was titrated.  


Cognitive behavioral therapy provided.





Acute encephalopathy and noncooperation with treatment was likely due to 

exacerbation of schizoaffective disorder.





Infectious disease specialist closely followed.  


Per infectious disease specialist, urinalysis revealed pyuria and bacteriuria,  

however patient was afebrile,  no leukocytosis and asymptomatic.  


She recommended to keep patient off antibiotics and observe closely.  


Strict aspiration precautions were maintained.


Patient was noncooperative with treatment, denies medications and care. 





While doing rounds late evening on 3/04 patient was not found in his room.  


Charge nurse and security were notified by attending nurse.  


Security reviewed CCTV camera.  Patient was  seen patient walking to  double 

door leading to [facility] . 


[employer] was informed for missing patient . [employer] was arrived and filed the report.





FINAL DIAGNOSES: 


Acute encephalopathy


COPD


Hypertension


Diabetes


Hypokalemia


Major depressive disorder.


Paranoid schizophrenia





 














Paulina Everett NP Mar 6, 2019 13:20

## 2019-07-06 ENCOUNTER — HOSPITAL ENCOUNTER (INPATIENT)
Dept: HOSPITAL 12 - ER | Age: 59
LOS: 11 days | Discharge: NURSING, REHAB OR LONG-TERM CARE | DRG: 885 | End: 2019-07-17
Payer: MEDICARE

## 2019-07-06 VITALS — WEIGHT: 187 LBS | BODY MASS INDEX: 26.18 KG/M2 | HEIGHT: 71 IN

## 2019-07-06 VITALS — DIASTOLIC BLOOD PRESSURE: 82 MMHG | SYSTOLIC BLOOD PRESSURE: 146 MMHG

## 2019-07-06 DIAGNOSIS — F19.10: ICD-10-CM

## 2019-07-06 DIAGNOSIS — G93.41: ICD-10-CM

## 2019-07-06 DIAGNOSIS — F29: Primary | ICD-10-CM

## 2019-07-06 DIAGNOSIS — Z59.0: ICD-10-CM

## 2019-07-06 DIAGNOSIS — Z91.19: ICD-10-CM

## 2019-07-06 DIAGNOSIS — Z79.899: ICD-10-CM

## 2019-07-06 DIAGNOSIS — Z86.59: ICD-10-CM

## 2019-07-06 DIAGNOSIS — J98.11: ICD-10-CM

## 2019-07-06 DIAGNOSIS — Z91.14: ICD-10-CM

## 2019-07-06 PROCEDURE — A4663 DIALYSIS BLOOD PRESSURE CUFF: HCPCS

## 2019-07-06 SDOH — ECONOMIC STABILITY - HOUSING INSECURITY: HOMELESSNESS: Z59.0

## 2019-07-06 NOTE — NUR
SAFETY PRECAUTIONS OBSERVED - ALL PERSONAL BELONGINSG OUT OF ROOM AND PLACED BY 
THE NURSING STATION. PT IS ALREADY IN A HOSPITAL GOWN.



NURSING SUPERVISOR, BLACK VALENZUELA, NOTIFIED - REQUESTED FOR 1:1 SITTER FOR SAFETY 
PRECAUTIONS. AWAITING ARRIVAL OF 1:1 SITTER.

## 2019-07-07 VITALS — DIASTOLIC BLOOD PRESSURE: 74 MMHG | SYSTOLIC BLOOD PRESSURE: 127 MMHG

## 2019-07-07 RX ADMIN — LORAZEPAM PRN MG: 1 TABLET ORAL at 15:48

## 2019-07-07 RX ADMIN — QUETIAPINE SCH MG: 100 TABLET, FILM COATED ORAL at 20:32

## 2019-07-07 RX ADMIN — QUETIAPINE SCH MG: 25 TABLET, FILM COATED ORAL at 18:10

## 2019-07-07 RX ADMIN — QUETIAPINE SCH MG: 25 TABLET, FILM COATED ORAL at 14:45

## 2019-07-07 NOTE — NUR
patient woke up for lunch, upset, doesnt like the food, pasta primavera. screamed and wanted 
food out of the room, taken out. covered self with blanket and kept quiet.another nurse 
offered food, agreed to have tuna sandwich. dietary called to give sandwiches for now till 
patient request otherwise.

## 2019-07-07 NOTE — NUR
pacing in hallway, looking at staff and went to one of the  patient"s room. nonverbal, just 
looking around. redirected.  med  x 1 with ativan.

## 2019-07-07 NOTE — NUR
asked if he wanted something, looked at the nurse and said, "Fuck off, " and turned away. 
Another upset patient kept on talking and difficult to stop at this patient, this patient 
remains non verbal but flip the patient and turn away.
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
## 2019-07-08 VITALS — DIASTOLIC BLOOD PRESSURE: 53 MMHG | SYSTOLIC BLOOD PRESSURE: 122 MMHG

## 2019-07-08 VITALS — SYSTOLIC BLOOD PRESSURE: 143 MMHG | DIASTOLIC BLOOD PRESSURE: 68 MMHG

## 2019-07-08 RX ADMIN — QUETIAPINE SCH MG: 25 TABLET, FILM COATED ORAL at 17:00

## 2019-07-08 RX ADMIN — LORAZEPAM PRN MG: 1 TABLET ORAL at 23:18

## 2019-07-08 RX ADMIN — QUETIAPINE SCH MG: 25 TABLET, FILM COATED ORAL at 08:40

## 2019-07-08 RX ADMIN — QUETIAPINE SCH MG: 100 TABLET, FILM COATED ORAL at 21:00

## 2019-07-08 NOTE — NUR
GPS: Nursing Notes: Thought Disorder:

Patient is awake and responding to his name, uncooperative with nursing care, labile, 
unpredictable behavior, belligerent behavior toward staff, using profanities toward female 
staff, loud and pressured speech, refusing his antibiotic, shouting, "Get away from me.. I 
already took my pills..", educated patient about his antibiotic medication, but continue to 
refused, stated, "Get the fuck away..", refusing to participate in therapeutic groups, poor 
impulse control, poor anger management, unable to formulate a viable plan for self care, 
continue with treatment plan.

## 2019-07-08 NOTE — NUR
Initial Discharge Note: 

Patient is currently homeless and will likely need placement. Patient unable to verbalize a 
viable discharge plan. Patient seems to lack any family or community support.  
will continue to collaborate with patient and MD regarding appropriate discharge plans for 
this patient.  will form a safe and proper discharge plan.

## 2019-07-09 VITALS — DIASTOLIC BLOOD PRESSURE: 57 MMHG | SYSTOLIC BLOOD PRESSURE: 104 MMHG

## 2019-07-09 VITALS — DIASTOLIC BLOOD PRESSURE: 78 MMHG | SYSTOLIC BLOOD PRESSURE: 125 MMHG

## 2019-07-09 RX ADMIN — QUETIAPINE SCH MG: 25 TABLET, FILM COATED ORAL at 17:00

## 2019-07-09 RX ADMIN — QUETIAPINE SCH MG: 100 TABLET, FILM COATED ORAL at 20:41

## 2019-07-09 RX ADMIN — QUETIAPINE SCH MG: 25 TABLET, FILM COATED ORAL at 09:00

## 2019-07-09 NOTE — NUR
received to care, lying in bed, asleep, but pleasant when approached. compliant with 
medications and staff direction. as of 2200, he remains asleep. no distress noted. will 
continue to monitor closely.

## 2019-07-10 VITALS — SYSTOLIC BLOOD PRESSURE: 111 MMHG | DIASTOLIC BLOOD PRESSURE: 58 MMHG

## 2019-07-10 VITALS — SYSTOLIC BLOOD PRESSURE: 104 MMHG | DIASTOLIC BLOOD PRESSURE: 53 MMHG

## 2019-07-10 RX ADMIN — QUETIAPINE SCH MG: 100 TABLET, FILM COATED ORAL at 23:00

## 2019-07-10 RX ADMIN — QUETIAPINE SCH MG: 100 TABLET, FILM COATED ORAL at 21:00

## 2019-07-10 NOTE — NUR
received to care, lying bed, asleep. was pleasant when approached, but calmly refused his 
medications. he the went back to sleep. he was ambulating in the hallway, at 2300, and took 
his medications at that time. he was given a bottle of water, and went back to bed. as of 
2330, he appears asleep. no distress noted. will continue to monitor closely.

## 2019-07-10 NOTE — NUR
PT STILL SLEEPING. REFUSED TO TAKE HIS ANTIBIOTIC MEDICATION AND APPEARS TO BE ANGRY AND 
WANTED ME TO LEAVE THE ROOM.

## 2019-07-10 NOTE — NUR
RECIEVED PT SOUND ASLEEP AND DOES NOT TO WAKE UP. VERY UNCOOPERATIVE. REFUSED TO EAT HIS 
BREAKFAST JUST BEEN LAYING IN BED.

## 2019-07-11 VITALS — SYSTOLIC BLOOD PRESSURE: 128 MMHG | DIASTOLIC BLOOD PRESSURE: 57 MMHG

## 2019-07-11 VITALS — SYSTOLIC BLOOD PRESSURE: 110 MMHG | DIASTOLIC BLOOD PRESSURE: 58 MMHG

## 2019-07-11 RX ADMIN — QUETIAPINE SCH MG: 100 TABLET, FILM COATED ORAL at 20:40

## 2019-07-11 NOTE — NUR
Gps/Lvn- Had been quiet ,  encouraged verbalizations of his feelings, monitored needs, 
medications compliant.

## 2019-07-11 NOTE — NUR
Discharge Planning note: 



 faxed a referral inquire to [facility] [[place] [address]; 
Phone: [phone]; Fax: [phone]] for possible patient discharge.

## 2019-07-11 NOTE — NUR
received to care, lying in bed, isolative, but minimally interactive. was observed pacing 
the hallway. asked to be assisted with a shave. after that, he  went to bed. was compliant 
with medications. and staff direction. as of 2200, he appears to be asleep. no distress 
noted. will continue to monitor closely.

## 2019-07-11 NOTE — NUR
Gps/Lvn-Had been isolative, staying in bed ,  not interactive, observed going to the 
activity room for few minutes but does not stay that long. Stares at times, but with no 
words uttered.

## 2019-07-11 NOTE — NUR
Gps/Lvn- Patient kept coming to food cart to get some of the  left over foods discouraged 
from  doing so, gets irritable when being redirected. Standing still staring at the staff. 
Observed for unpredictable behavior.

## 2019-07-12 VITALS — SYSTOLIC BLOOD PRESSURE: 144 MMHG | DIASTOLIC BLOOD PRESSURE: 94 MMHG

## 2019-07-12 VITALS — DIASTOLIC BLOOD PRESSURE: 50 MMHG | SYSTOLIC BLOOD PRESSURE: 107 MMHG

## 2019-07-12 RX ADMIN — QUETIAPINE SCH MG: 100 TABLET, FILM COATED ORAL at 21:07

## 2019-07-12 NOTE — NUR
Pt received to care, intermittently pacing in hallway, visiting in activities room, and 
resting in bed quietly.  Pt has been compliant with prescribed medications and cooperative 
with care to this point.  Will continue to monitor closely.

## 2019-07-13 VITALS — DIASTOLIC BLOOD PRESSURE: 68 MMHG | SYSTOLIC BLOOD PRESSURE: 115 MMHG

## 2019-07-13 VITALS — DIASTOLIC BLOOD PRESSURE: 85 MMHG | SYSTOLIC BLOOD PRESSURE: 133 MMHG

## 2019-07-13 RX ADMIN — QUETIAPINE SCH MG: 100 TABLET, FILM COATED ORAL at 20:15

## 2019-07-14 VITALS — DIASTOLIC BLOOD PRESSURE: 56 MMHG | SYSTOLIC BLOOD PRESSURE: 121 MMHG

## 2019-07-14 LAB
ALP SERPL-CCNC: 93 U/L (ref 50–136)
ALT SERPL W/O P-5'-P-CCNC: 14 U/L (ref 16–63)
AST SERPL-CCNC: 8 U/L (ref 15–37)
BASOPHILS # BLD AUTO: 0 K/UL (ref 0–8)
BASOPHILS NFR BLD AUTO: 0.4 % (ref 0–2)
BILIRUB SERPL-MCNC: 0.6 MG/DL (ref 0.2–1)
BUN SERPL-MCNC: 12 MG/DL (ref 7–18)
CHLORIDE SERPL-SCNC: 105 MMOL/L (ref 98–107)
CHOLEST SERPL-MCNC: 124 MG/DL (ref ?–200)
CO2 SERPL-SCNC: 30 MMOL/L (ref 21–32)
CREAT SERPL-MCNC: 0.7 MG/DL (ref 0.6–1.3)
EOSINOPHIL # BLD AUTO: 0.2 K/UL (ref 0–0.7)
EOSINOPHIL NFR BLD AUTO: 2.8 % (ref 0–7)
GLUCOSE SERPL-MCNC: 100 MG/DL (ref 74–106)
HCT VFR BLD AUTO: 39.9 % (ref 36.7–47.1)
HDLC SERPL-MCNC: 39 MG/DL (ref 40–60)
HGB BLD-MCNC: 13 G/DL (ref 12.5–16.3)
LYMPHOCYTES # BLD AUTO: 2.7 K/UL (ref 20–40)
LYMPHOCYTES NFR BLD AUTO: 43.9 % (ref 20.5–51.5)
MAGNESIUM SERPL-MCNC: 1.9 MG/DL (ref 1.8–2.4)
MCH RBC QN AUTO: 29.7 UUG (ref 23.8–33.4)
MCHC RBC AUTO-ENTMCNC: 33 G/DL (ref 32.5–36.3)
MCV RBC AUTO: 91.2 FL (ref 73–96.2)
MONOCYTES # BLD AUTO: 0.5 K/UL (ref 2–10)
MONOCYTES NFR BLD AUTO: 7.8 % (ref 0–11)
NEUTROPHILS # BLD AUTO: 2.8 K/UL (ref 1.8–8.9)
NEUTROPHILS NFR BLD AUTO: 45.1 % (ref 38.5–71.5)
PHOSPHATE SERPL-MCNC: 4 MG/DL (ref 2.5–4.9)
PLATELET # BLD AUTO: 242 K/UL (ref 152–348)
POTASSIUM SERPL-SCNC: 3.8 MMOL/L (ref 3.5–5.1)
RBC # BLD AUTO: 4.38 MIL/UL (ref 4.06–5.63)
TRIGL SERPL-MCNC: 65 MG/DL (ref 30–150)
TSH SERPL DL<=0.005 MIU/L-ACNC: 2.56 MIU/ML (ref 0.36–3.74)
WBC # BLD AUTO: 6.2 K/UL (ref 3.6–10.2)
WS STN SPEC: 6.8 G/DL (ref 6.4–8.2)

## 2019-07-14 RX ADMIN — QUETIAPINE SCH MG: 200 TABLET, FILM COATED ORAL at 20:28

## 2019-07-15 RX ADMIN — QUETIAPINE SCH MG: 200 TABLET, FILM COATED ORAL at 20:52

## 2019-07-16 VITALS — SYSTOLIC BLOOD PRESSURE: 132 MMHG | DIASTOLIC BLOOD PRESSURE: 72 MMHG

## 2019-07-16 RX ADMIN — QUETIAPINE SCH MG: 200 TABLET, FILM COATED ORAL at 20:07

## 2019-07-16 NOTE — NUR
FIREARMS REPORT: 

 completed and submitted a [employer] firearms report for 5250 [employer] Disability 
certification. A copy of report has been placed in patient chart.

## 2019-07-16 NOTE — NUR
received to care, lying in bed, isolative, but pleasant upon approach. compliant with 
medications. and staff direction. as of 2200, he appears to be asleep. no distress noted. 
will continue to monitor closely.

## 2019-07-17 NOTE — NUR
PT IS BEING DISCHARGED TO [facility]. VS ARE STABLE, NO DISTRESS. PT IS COOPERATING. 
REPORT WAS GIVEN TO CARTER WILKS. ALL BELONGINGS RETURNED. PT REFUSED TO SIGN DISCHARGE 
PAPERWORK.

## 2019-07-18 ENCOUNTER — HOSPITAL ENCOUNTER (INPATIENT)
Dept: HOSPITAL 12 - ER | Age: 59
LOS: 15 days | Discharge: NURSING, REHAB OR LONG-TERM CARE | DRG: 885 | End: 2019-08-02
Payer: MEDICARE

## 2019-07-18 VITALS — BODY MASS INDEX: 21.29 KG/M2 | HEIGHT: 78 IN | WEIGHT: 184 LBS

## 2019-07-18 DIAGNOSIS — F09: ICD-10-CM

## 2019-07-18 DIAGNOSIS — G93.41: ICD-10-CM

## 2019-07-18 DIAGNOSIS — Z91.14: ICD-10-CM

## 2019-07-18 DIAGNOSIS — F41.9: ICD-10-CM

## 2019-07-18 DIAGNOSIS — Z59.0: ICD-10-CM

## 2019-07-18 DIAGNOSIS — F19.10: ICD-10-CM

## 2019-07-18 DIAGNOSIS — F20.0: Primary | ICD-10-CM

## 2019-07-18 LAB
BASOPHILS # BLD AUTO: 0.1 K/UL (ref 0–8)
BASOPHILS NFR BLD AUTO: 0.8 % (ref 0–2)
BUN SERPL-MCNC: 19 MG/DL (ref 7–18)
CHLORIDE SERPL-SCNC: 104 MMOL/L (ref 98–107)
CO2 SERPL-SCNC: 25 MMOL/L (ref 21–32)
CREAT SERPL-MCNC: 0.9 MG/DL (ref 0.6–1.3)
EOSINOPHIL # BLD AUTO: 0 K/UL (ref 0–0.7)
EOSINOPHIL NFR BLD AUTO: 0.4 % (ref 0–7)
ETHANOL SERPL-MCNC: < 3 MG/DL (ref 0–0)
GLUCOSE SERPL-MCNC: 106 MG/DL (ref 74–106)
HCT VFR BLD AUTO: 40.5 % (ref 36.7–47.1)
HGB BLD-MCNC: 13.6 G/DL (ref 12.5–16.3)
LYMPHOCYTES # BLD AUTO: 2.7 K/UL (ref 20–40)
LYMPHOCYTES NFR BLD AUTO: 24.9 % (ref 20.5–51.5)
MCH RBC QN AUTO: 30.4 UUG (ref 23.8–33.4)
MCHC RBC AUTO-ENTMCNC: 34 G/DL (ref 32.5–36.3)
MCV RBC AUTO: 90.5 FL (ref 73–96.2)
MONOCYTES # BLD AUTO: 0.7 K/UL (ref 2–10)
MONOCYTES NFR BLD AUTO: 6 % (ref 0–11)
NEUTROPHILS # BLD AUTO: 7.4 K/UL (ref 1.8–8.9)
NEUTROPHILS NFR BLD AUTO: 67.9 % (ref 38.5–71.5)
PLATELET # BLD AUTO: 261 K/UL (ref 152–348)
POTASSIUM SERPL-SCNC: 4.1 MMOL/L (ref 3.5–5.1)
RBC # BLD AUTO: 4.48 MIL/UL (ref 4.06–5.63)
WBC # BLD AUTO: 11 K/UL (ref 3.6–10.2)

## 2019-07-18 PROCEDURE — A4663 DIALYSIS BLOOD PRESSURE CUFF: HCPCS

## 2019-07-18 PROCEDURE — G0480 DRUG TEST DEF 1-7 CLASSES: HCPCS

## 2019-07-18 SDOH — ECONOMIC STABILITY - HOUSING INSECURITY: HOMELESSNESS: Z59.0

## 2019-07-19 VITALS — SYSTOLIC BLOOD PRESSURE: 119 MMHG | DIASTOLIC BLOOD PRESSURE: 89 MMHG

## 2019-07-19 LAB
ALP SERPL-CCNC: 120 U/L (ref 50–136)
ALT SERPL W/O P-5'-P-CCNC: 17 U/L (ref 16–63)
APAP SERPL-MCNC: < 2 UG/ML (ref 10–30)
AST SERPL-CCNC: 19 U/L (ref 15–37)
BILIRUB DIRECT SERPL-MCNC: 0.1 MG/DL (ref 0–0.2)
BILIRUB SERPL-MCNC: 0.6 MG/DL (ref 0.2–1)
CK SERPL-CCNC: 229 U/L (ref 39–308)
TSH SERPL DL<=0.005 MIU/L-ACNC: 2.22 MIU/ML (ref 0.36–3.74)
WS STN SPEC: 7.8 G/DL (ref 6.4–8.2)

## 2019-07-19 RX ADMIN — HALOPERIDOL SCH MG: 5 TABLET ORAL at 10:42

## 2019-07-19 RX ADMIN — HALOPERIDOL SCH MG: 5 TABLET ORAL at 20:43

## 2019-07-19 RX ADMIN — Medication SCH MG: at 20:43

## 2019-07-19 RX ADMIN — Medication SCH MG: at 10:42

## 2019-07-19 RX ADMIN — HALOPERIDOL SCH MG: 5 TABLET ORAL at 16:41

## 2019-07-19 RX ADMIN — Medication SCH MG: at 16:41

## 2019-07-19 RX ADMIN — LORAZEPAM PRN MG: 0.5 TABLET ORAL at 19:43

## 2019-07-19 NOTE — NUR
PT SLEPT 3 HOURS. PT SHOWS NO ACUTE DISTRESS.  SAFETY AND COMFORT PROVIDED. WILL ENDORSE 
ACCORDINGLY TO INCOMING NURSE FOR CONTINUITY OF CARE.

## 2019-07-19 NOTE — NUR
PT DOESN'T WANT TO BE TOUCH. DOESN'T WANT TO PUT ON DIAPER AND PANTS. PT STABLE IN NO ACUTE 
DISTRESS. WILL CONTINUE TO MONITOR.

## 2019-07-19 NOTE — NUR
Patient now on 5150 hold for DTS/GD. Report given to Sylvie MACHADO on [facility]. Psych JORDY Degroot, Epic MD Chris Isbell.

## 2019-07-19 NOTE — NUR
received to care, pacing the hallway, isolative, appearing distracted by internal stimuli, 
but pleasant when approached by this writer. PRN ativan was given at 1943, and within an 
hour, he appeared slightly calmer. he was given haldol and cogentin at 2043, which he had 
refused at 1700. as of 2200, he remains asleep. no distress noted. will continue to monitor 
closely.

## 2019-07-19 NOTE — NUR
Attempted to get out of bed, removed his clothing and had a bowel movement on 
the bed. Patient cleaned up and assisted back in bed at this time.
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
## 2019-07-20 VITALS — DIASTOLIC BLOOD PRESSURE: 70 MMHG | SYSTOLIC BLOOD PRESSURE: 116 MMHG

## 2019-07-20 RX ADMIN — HALOPERIDOL SCH MG: 5 TABLET ORAL at 18:17

## 2019-07-20 RX ADMIN — HALOPERIDOL SCH MG: 5 TABLET ORAL at 09:00

## 2019-07-20 RX ADMIN — Medication SCH MG: at 09:00

## 2019-07-20 RX ADMIN — Medication SCH MG: at 18:17

## 2019-07-20 NOTE — NUR
slept well, all night, 8 hours, total. continues to sleep, but is easy to awaken. sitter 
remains at side. no distress noted.

## 2019-07-21 RX ADMIN — HALOPERIDOL SCH MG: 5 TABLET ORAL at 09:00

## 2019-07-21 RX ADMIN — Medication SCH MG: at 16:18

## 2019-07-21 RX ADMIN — Medication SCH MG: at 09:00

## 2019-07-21 RX ADMIN — HALOPERIDOL SCH MG: 5 TABLET ORAL at 16:18

## 2019-07-21 NOTE — NUR
Patient received while pacing in the hallway, non interactive, isolative, seemed angry. No 
acute distress was noted. Did not have a good sleep last night. Refused to take PRN Ambien 
and Ativan on multiple attempts. Not cooperative, no compliant with medication and care. 
will continue to monitor closely and will endorse to the day shift nurse accordingly.

## 2019-07-21 NOTE — NUR
Pt received this morning resting in bed. Upon initial assessment Pt yelled for staff to 
leave him alone in his room. Second contact initiated, Pt calmly expressed he wished to be 
left alone and refused all medications. Pt continued to walk the hallway following lunch. Pt 
able to CFS. Will continue to monitor closely for safety.

## 2019-07-22 RX ADMIN — LORAZEPAM PRN MG: 0.5 TABLET ORAL at 10:30

## 2019-07-22 RX ADMIN — HALOPERIDOL SCH MG: 5 TABLET ORAL at 09:00

## 2019-07-22 RX ADMIN — Medication SCH MG: at 09:00

## 2019-07-22 RX ADMIN — HALOPERIDOL SCH MG: 5 TABLET ORAL at 13:00

## 2019-07-22 RX ADMIN — HALOPERIDOL SCH MG: 5 TABLET ORAL at 16:22

## 2019-07-22 RX ADMIN — LORAZEPAM PRN MG: 0.5 TABLET ORAL at 16:22

## 2019-07-22 RX ADMIN — Medication SCH MG: at 16:22

## 2019-07-22 NOTE — NUR
GPS: Nursing Notes: Reassessment of Chemical Restraint:

Patient continue to be angry, but less intensive, isolative and withdrawn in his room, not 
trying to strike out, medications IM was effective, continue with 1:1 for safety, R=18, 
continue to monitor for safety, continue with treatment plan.

## 2019-07-22 NOTE — NUR
Pt appears to be sleeping at this time, breathing even and unlabored. In no apparent 
physical distress, will endorse Pt care to day shift nurse.

## 2019-07-22 NOTE — NUR
Chemical Restraint Note:



Pt was fixated and entering or peering into female room 145 and had to redirected away from 
that room several times. Pt refused any prn medication station he was "fine." The last time 
Pt was asked to step away from that room, Pt purposely urinated and defecated on the hallway 
floor then refused to change his soiled clothing. Pt then went to his room and began banging 
his head so hard against the wall, that the skin on his forehead was broken. Pt refused 
physical assessment and refused band aid, but appears to remain at previous mental baseline. 
Pt was then offered a prn again and asked if he could contract for safety. Pt again refused 
prn but agreed to not hit his head or hurt himself anymore. Approximately 2 minutes after 
this writer left the room, Pt started banging his head on the wall again. Dr Danielle 
notified of Pt behavior and Haldol 5mg, Ativan 1mg, and Cogentin 2mg IM ordered. Security 
called for show of support. IM administered with hands on Pt less than one minute, Pt 
cooperative with IM administration process, no injury sustained to Pt or staff. Pt placed in 
clean hospital gown and pants. VS stable, Pt breathing even and unlabored at this time, 
appears to be sleeping at this time. Will continue to monitor.
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
## 2019-07-22 NOTE — NUR
GPS: Nursing Notes: Chemical Restraint:

Patient continue to posturing to staff, using profanities toward staff, unable to be 
redirected, gets easily angry and irritable when redirected, refusing PO medications, "Fuck 
off... ", loud an angry affect, responding to internal stimuli by staring at the window and 
the ceiling, Dr. Tineo called and ordered: Haldol 5mg IM and Benadryl 50mg IM x1 stat, 
R=18, continue with 1:1 sitter for safety, medication IM given at this time, continue with 
treatment plan.

## 2019-07-23 VITALS — DIASTOLIC BLOOD PRESSURE: 62 MMHG | SYSTOLIC BLOOD PRESSURE: 108 MMHG

## 2019-07-23 RX ADMIN — LORAZEPAM PRN MG: 0.5 TABLET ORAL at 13:09

## 2019-07-23 RX ADMIN — Medication SCH MG: at 09:00

## 2019-07-23 RX ADMIN — Medication SCH MG: at 16:27

## 2019-07-23 RX ADMIN — HALOPERIDOL SCH MG: 5 TABLET ORAL at 09:00

## 2019-07-23 RX ADMIN — HALOPERIDOL SCH MG: 5 TABLET ORAL at 12:35

## 2019-07-23 RX ADMIN — HALOPERIDOL SCH MG: 5 TABLET ORAL at 16:27

## 2019-07-23 NOTE — NUR
GPS: Nursing Notes: Destructive Behavior To Others:

Patient is awake and responding to internal stimuli by staring at the window and the 
ceiling, mumbling to self, poor anger management, continue with 1:1 sitter for safety, 
labile, unpredictable behavior, pacing in the hallway, when in his room, walking toward 
female staff invading her personal space and staring hard at staff, unable to follow staff 
directions, refusing his psych. medication, urinating on self, episodes of defecating when 
pacing, refusing to shower at this time, internally preoccupied, poor grooming, unkempt 
appearance, verbal abusive toward staff by using profanities at times, episodes of staring 
at staff when pacing, unable to formulate for self care, continue with treatment plan.

## 2019-07-24 VITALS — DIASTOLIC BLOOD PRESSURE: 68 MMHG | SYSTOLIC BLOOD PRESSURE: 110 MMHG

## 2019-07-24 VITALS — DIASTOLIC BLOOD PRESSURE: 68 MMHG | SYSTOLIC BLOOD PRESSURE: 112 MMHG

## 2019-07-24 VITALS — SYSTOLIC BLOOD PRESSURE: 99 MMHG | DIASTOLIC BLOOD PRESSURE: 63 MMHG

## 2019-07-24 RX ADMIN — HALOPERIDOL SCH MG: 5 TABLET ORAL at 16:04

## 2019-07-24 RX ADMIN — Medication SCH MG: at 16:04

## 2019-07-24 RX ADMIN — Medication SCH MG: at 09:00

## 2019-07-24 RX ADMIN — LORAZEPAM PRN MG: 0.5 TABLET ORAL at 18:33

## 2019-07-24 RX ADMIN — HALOPERIDOL SCH MG: 5 TABLET ORAL at 09:00

## 2019-07-24 RX ADMIN — HALOPERIDOL SCH MG: 5 TABLET ORAL at 13:00

## 2019-07-24 NOTE — NUR
Patient was resting in bed, but then noted again wandering the [place] and went to the same 
female room and stood in the doorway staring at the female patient. Female patients reaction 
was fearful which seemed to please this patient. sitter and staff tried to redirect and was 
being non-compliant at first, but eventually went back to bed. Will continue to monitor.

## 2019-07-24 NOTE — NUR
Received patient wandering [place] with 1:1 sitter. Patient continues to stare into patients 
room and female in room 145. Was noted standing in the doorway of 145. Sitter and staff 
continuously have to redirect patient away from patients rooms, female patient crying in 
fear of this patient.

## 2019-07-24 NOTE — NUR
IT WAS OBSERVED THAT PATIENT GOT OUT OF HIS BED AND STARTED PACING THE HALLWAY, THEN HE 
STOPPED BY THE DOOR IN ROOM 145 (ALL FEMALE ROOM), HE ATTEMPTED TO ENTER THE ROOM, BUT HE 
WAS IMMEDIATELY REDIRECTED AND SENT BACK TO HIS ROOM. WILL CONTINUE TO MONITOR.

## 2019-07-25 VITALS — DIASTOLIC BLOOD PRESSURE: 96 MMHG | SYSTOLIC BLOOD PRESSURE: 148 MMHG

## 2019-07-25 RX ADMIN — HALOPERIDOL SCH MG: 5 TABLET ORAL at 17:24

## 2019-07-25 RX ADMIN — Medication SCH MG: at 08:28

## 2019-07-25 RX ADMIN — HALOPERIDOL SCH MG: 5 TABLET ORAL at 13:00

## 2019-07-25 RX ADMIN — HALOPERIDOL SCH MG: 5 TABLET ORAL at 08:28

## 2019-07-25 RX ADMIN — Medication SCH MG: at 17:21

## 2019-07-26 VITALS — DIASTOLIC BLOOD PRESSURE: 91 MMHG | SYSTOLIC BLOOD PRESSURE: 143 MMHG

## 2019-07-26 VITALS — DIASTOLIC BLOOD PRESSURE: 56 MMHG | SYSTOLIC BLOOD PRESSURE: 118 MMHG

## 2019-07-26 RX ADMIN — Medication SCH MG: at 08:48

## 2019-07-26 RX ADMIN — HALOPERIDOL SCH MG: 5 TABLET ORAL at 08:47

## 2019-07-26 RX ADMIN — Medication SCH MG: at 18:17

## 2019-07-26 RX ADMIN — HALOPERIDOL SCH MG: 5 TABLET ORAL at 12:59

## 2019-07-26 RX ADMIN — HALOPERIDOL SCH MG: 5 TABLET ORAL at 18:17

## 2019-07-26 NOTE — NUR
A NOISE WAS HERD COMING FORM PATIENT'S ROOM, UPON INTERVIEW WITH THE SITTER. HE STATED THAT 
PATIENT THROW HIS PILLOW AT HIM AND STATED "WHAT ARE YOU LOOKING AT!!!". PATIENT WAS 
REDIRECTED AND WENT BACK TO SLEEP. WILL CONTINUE TO MONITOR.

## 2019-07-27 VITALS — DIASTOLIC BLOOD PRESSURE: 59 MMHG | SYSTOLIC BLOOD PRESSURE: 92 MMHG

## 2019-07-27 RX ADMIN — HALOPERIDOL SCH MG: 5 TABLET ORAL at 09:00

## 2019-07-27 RX ADMIN — Medication SCH MG: at 17:00

## 2019-07-27 RX ADMIN — HALOPERIDOL SCH MG: 5 TABLET ORAL at 13:00

## 2019-07-27 RX ADMIN — Medication SCH MG: at 09:00

## 2019-07-27 RX ADMIN — HALOPERIDOL SCH MG: 5 TABLET ORAL at 17:00

## 2019-07-27 NOTE — NUR
GPS: Remain calm after im given for uncontrol behavior. continue on 1:1 sitter @ bedside for 
safety. slept 6:45 hrs through the night. resting in bed at this time.

## 2019-07-27 NOTE — NUR
RECEIVEDPATIENT SLEEPING WITH 1;1 SITTER  FOR SAFETY.NO AGGRESSIVE BEHAVIOR NOTED AT THIS 
TIME.WILL CONTINUE TO MONITOR.
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
## 2019-07-28 RX ADMIN — Medication SCH MG: at 08:18

## 2019-07-28 RX ADMIN — HALOPERIDOL SCH MG: 5 TABLET ORAL at 08:18

## 2019-07-28 RX ADMIN — HALOPERIDOL SCH MG: 5 TABLET ORAL at 16:08

## 2019-07-28 RX ADMIN — HALOPERIDOL SCH MG: 5 TABLET ORAL at 13:00

## 2019-07-28 RX ADMIN — Medication SCH MG: at 16:07

## 2019-07-28 NOTE — NUR
RECEIVED PATIENT IN BED AND UPON INTERACTION HE ONLY OPENED HIS EYES GAVE THE BAREST NOD OF 
THE HEAD AND WENT BACK TO SLEEP.HAS 1;1 SITTER FOR SAFETY.HAS PRN MEDS.WILL CONTINUE TO 
MONITOR.

## 2019-07-29 VITALS — SYSTOLIC BLOOD PRESSURE: 107 MMHG | DIASTOLIC BLOOD PRESSURE: 57 MMHG

## 2019-07-29 RX ADMIN — HALOPERIDOL SCH MG: 5 TABLET ORAL at 08:07

## 2019-07-29 RX ADMIN — Medication SCH MG: at 21:59

## 2019-07-29 RX ADMIN — LORAZEPAM PRN MG: 0.5 TABLET ORAL at 16:28

## 2019-07-29 RX ADMIN — HALOPERIDOL SCH MG: 5 TABLET ORAL at 21:59

## 2019-07-29 RX ADMIN — Medication SCH MG: at 08:07

## 2019-07-29 NOTE — NUR
received to care, lying in bed, asleep. had to be woken up to take his vital signs. 
afterwards, he went right back to sleep. as of 2200, he remains asleep, so his bedtime 
medications were held. will reoffer, when he is awake. 1;1 sitter remains within line of 
sight, at all times, for safety. will continue to monitor closely.

## 2019-07-30 VITALS — DIASTOLIC BLOOD PRESSURE: 60 MMHG | SYSTOLIC BLOOD PRESSURE: 108 MMHG

## 2019-07-30 VITALS — SYSTOLIC BLOOD PRESSURE: 103 MMHG | DIASTOLIC BLOOD PRESSURE: 50 MMHG

## 2019-07-30 RX ADMIN — Medication SCH MG: at 11:37

## 2019-07-30 RX ADMIN — HALOPERIDOL SCH MG: 5 TABLET ORAL at 11:38

## 2019-07-30 RX ADMIN — Medication SCH MG: at 20:24

## 2019-07-30 RX ADMIN — HALOPERIDOL SCH MG: 5 TABLET ORAL at 20:23

## 2019-07-30 NOTE — NUR
GPS: Nursing Notes: Reassessment of Chemical Restraint:

Patient is awake and responding to his name, following staff directions, cooperative with 
nursing care at this time, R=18, medications IM stat is effective, continue to monitor for 
safety, continue with treatment plan.

## 2019-07-30 NOTE — NUR
received to care, lying in bed, isolative, but pleasant when approached. compliant with 
medications and staff direction. as of 2200, he appears to be asleep. no distress noted. 
will continue to monitor closely.
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
## 2019-07-30 NOTE — NUR
GPS: Nursing Notes: Chemical Restraint:

Patient is awake and responding to internal stimuli by staring at the window and the 
ceiling, angry affect, unable to follow direction, striking out at staff, posturing to staff 
with his fist, staring down at staff,  medicated with Haldol 5mg IM and Benadryl 50mg IM x1 
STAT, continue to monitor for safety, continue with treatment plan.

## 2019-07-31 VITALS — DIASTOLIC BLOOD PRESSURE: 70 MMHG | SYSTOLIC BLOOD PRESSURE: 120 MMHG

## 2019-07-31 VITALS — DIASTOLIC BLOOD PRESSURE: 55 MMHG | SYSTOLIC BLOOD PRESSURE: 115 MMHG

## 2019-07-31 VITALS — DIASTOLIC BLOOD PRESSURE: 62 MMHG | SYSTOLIC BLOOD PRESSURE: 109 MMHG

## 2019-07-31 RX ADMIN — GABAPENTIN SCH MG: 100 CAPSULE ORAL at 12:49

## 2019-07-31 RX ADMIN — LORAZEPAM PRN MG: 0.5 TABLET ORAL at 06:01

## 2019-07-31 RX ADMIN — Medication SCH MG: at 21:03

## 2019-07-31 RX ADMIN — CLONAZEPAM SCH MG: 0.5 TABLET ORAL at 10:33

## 2019-07-31 RX ADMIN — CLONAZEPAM SCH MG: 0.5 TABLET ORAL at 12:49

## 2019-07-31 RX ADMIN — GABAPENTIN SCH MG: 100 CAPSULE ORAL at 10:34

## 2019-07-31 RX ADMIN — HALOPERIDOL SCH MG: 5 TABLET ORAL at 21:03

## 2019-07-31 RX ADMIN — CLONAZEPAM SCH MG: 0.5 TABLET ORAL at 13:52

## 2019-07-31 RX ADMIN — LORAZEPAM PRN MG: 0.5 TABLET ORAL at 19:58

## 2019-07-31 RX ADMIN — GABAPENTIN SCH MG: 100 CAPSULE ORAL at 13:53

## 2019-07-31 RX ADMIN — CLONAZEPAM SCH MG: 0.5 TABLET ORAL at 17:35

## 2019-07-31 RX ADMIN — GABAPENTIN SCH MG: 100 CAPSULE ORAL at 17:35

## 2019-07-31 NOTE — NUR
offered medications due at 1700, at the beginning patient refused to take the medication , 
upset stated " leave me alone, I don't want to take that medications. Encouraged  and 
explained to patient the risk and  benifets of each medications and took it with 2-3 
prompting.

## 2019-07-31 NOTE — NUR
received to care, pacing the hallway, talking to self, non interactive when engaged. PRN 
ativan was given at 1958. he appeared calmer, when given his routine medications, about an 
hour later. he was observed by several staff to be staring at them, as he walks by. 
difficult to redirect, as he ignores staff when engaged. monitored closely for safety. as of 
2200, he continues to pace intermittently. currently lying in bed. no distress noted. will 
continue to monitor closely.

## 2019-07-31 NOTE — NUR
has been pacing the hallway, the last few minutes. PRN ativan was offered, but he refused. 
will continue to monitor closely.

## 2019-08-01 RX ADMIN — Medication SCH MG: at 20:40

## 2019-08-01 RX ADMIN — CLONAZEPAM SCH MG: 0.5 TABLET ORAL at 11:13

## 2019-08-01 RX ADMIN — GABAPENTIN SCH MG: 100 CAPSULE ORAL at 13:05

## 2019-08-01 RX ADMIN — CLONAZEPAM SCH MG: 0.5 TABLET ORAL at 17:43

## 2019-08-01 RX ADMIN — CLONAZEPAM SCH MG: 0.5 TABLET ORAL at 13:05

## 2019-08-01 RX ADMIN — HALOPERIDOL SCH MG: 5 TABLET ORAL at 20:40

## 2019-08-01 RX ADMIN — GABAPENTIN SCH MG: 100 CAPSULE ORAL at 11:13

## 2019-08-01 RX ADMIN — GABAPENTIN SCH MG: 100 CAPSULE ORAL at 17:43

## 2019-08-01 NOTE — NUR
offered morning medications 3 times, patient refused stated " go away, I don't want to take 
any medications." Will offer again.

## 2019-08-02 RX ADMIN — GABAPENTIN SCH MG: 100 CAPSULE ORAL at 09:18

## 2019-08-02 RX ADMIN — CLONAZEPAM SCH MG: 0.5 TABLET ORAL at 09:18

## 2019-08-02 NOTE — NUR
1100  Called report to [facility] spoke with CARTER Carrillo who is admiting 
the patient , informed about medications to continue upon hospital dicharged. . 1205 Patient 
discharged to [facility] via ambulance. Patient alert and ox1, denies 
SI, HI.  No delusion. No a/v hallucination noted.

## 2020-01-01 NOTE — NUR
SLEPT WELL FOR APPROX.08;30HRS.HAS A 1;1 SITTER BY HIS SIDE FOR SAFETY. EOAE (evoked otoacoustic emission)

## 2020-03-04 NOTE — DIAGNOSTIC IMAGING REPORT
Clinic Administered Medication Documentation      Injection Documentation     Injection was administered by provider (please see MAR for given by information). Please see MAR and medication order for additional information.     Site: Joint injection   Medication Used: Kenalog 40mg    Expiration Date:  10/2021  The following medication was given by Bryn Arzate PA-C:     MEDICATION: Kenalog 40mg/1ml  ROUTE: Joint Injection  SITE: left knee  DOSE: 1 mL  LOT #: [id number]  : [employer]  EXPIRATION DATE:  10/2021  NDC: [address]-1                           EXAM:

  CT Head Without Intravenous Contrast

 

CLINICAL HISTORY:

  AMS

 

TECHNIQUE:

  Axial computed tomography images of the head/brain without intravenous 

contrast.  CTDI is 140 mGy and DLP is 2800 mGy-cm.  One or more of the 

following dose reduction techniques were used: automated exposure control,

 adjustment of the mA and/or kV according to patient size, use of 

iterative reconstruction technique.

  Coronal and sagittal reformatted images were created and reviewed.

 

COMPARISON:

  CT of the facial bones obtained the same date.

 

FINDINGS:

  Limitations:  Exam degraded by motion artifact.

  Brain:  Unremarkable.   No evidence of acute intracranial hemorrhage.  

No significant white matter disease.  No edema.  No mass effect or 

midline shift.

  Ventricles:  Unremarkable.  No ventriculomegaly.

  Bones/joints:  Unremarkable.  No depressed skull fracture.

  Soft tissues:  Soft tissue swelling in the frontal scalp with possible 

subgaleal hematoma.

  Sinuses:  Unremarkable as visualized.  No acute sinusitis.

  Mastoid air cells:  Partial opacification of the left mastoid air cells,

 suggesting left mastoid effusion.  The right mastoid air cells are clear.

 

IMPRESSION:     

1.  No acute intracranial findings.

2.  Soft tissue swelling in the frontal scalp with possible subgaleal 

hematoma.

3.  Partial opacification of the left mastoid air cells, suggesting left 

mastoid effusion.

## 2020-06-11 ENCOUNTER — HOSPITAL ENCOUNTER (EMERGENCY)
Dept: HOSPITAL 72 - EMR | Age: 60
Discharge: HOME | End: 2020-06-11
Payer: SELF-PAY

## 2020-06-11 VITALS
BODY MASS INDEX: 20.32 KG/M2 | HEIGHT: 72 IN | DIASTOLIC BLOOD PRESSURE: 86 MMHG | WEIGHT: 150 LBS | SYSTOLIC BLOOD PRESSURE: 120 MMHG

## 2020-06-11 VITALS — SYSTOLIC BLOOD PRESSURE: 125 MMHG | DIASTOLIC BLOOD PRESSURE: 88 MMHG

## 2020-06-11 DIAGNOSIS — R41.82: Primary | ICD-10-CM

## 2020-06-11 LAB
ADD MANUAL DIFF: NO
ANION GAP SERPL CALC-SCNC: 12 MMOL/L (ref 5–15)
BASOPHILS NFR BLD AUTO: 0.9 % (ref 0–2)
BUN SERPL-MCNC: 26 MG/DL (ref 7–18)
CALCIUM SERPL-MCNC: 8.4 MG/DL (ref 8.5–10.1)
CHLORIDE SERPL-SCNC: 103 MMOL/L (ref 98–107)
CO2 SERPL-SCNC: 26 MMOL/L (ref 21–32)
CREAT SERPL-MCNC: 1.1 MG/DL (ref 0.55–1.3)
EOSINOPHIL NFR BLD AUTO: 1.5 % (ref 0–3)
ERYTHROCYTE [DISTWIDTH] IN BLOOD BY AUTOMATED COUNT: 13.3 % (ref 11.6–14.8)
HCT VFR BLD CALC: 42.6 % (ref 42–52)
HGB BLD-MCNC: 13.2 G/DL (ref 14.2–18)
LYMPHOCYTES NFR BLD AUTO: 32.1 % (ref 20–45)
MCV RBC AUTO: 101 FL (ref 80–99)
MONOCYTES NFR BLD AUTO: 10.2 % (ref 1–10)
NEUTROPHILS NFR BLD AUTO: 55.3 % (ref 45–75)
PLATELET # BLD: 312 K/UL (ref 150–450)
POTASSIUM SERPL-SCNC: 3.1 MMOL/L (ref 3.5–5.1)
RBC # BLD AUTO: 4.24 M/UL (ref 4.7–6.1)
SODIUM SERPL-SCNC: 141 MMOL/L (ref 136–145)
WBC # BLD AUTO: 8.1 K/UL (ref 4.8–10.8)

## 2020-06-11 PROCEDURE — 99284 EMERGENCY DEPT VISIT MOD MDM: CPT

## 2020-06-11 PROCEDURE — 80048 BASIC METABOLIC PNL TOTAL CA: CPT

## 2020-06-11 PROCEDURE — 85025 COMPLETE CBC W/AUTO DIFF WBC: CPT

## 2020-06-11 PROCEDURE — 36415 COLL VENOUS BLD VENIPUNCTURE: CPT

## 2020-06-11 PROCEDURE — 70450 CT HEAD/BRAIN W/O DYE: CPT

## 2020-06-11 NOTE — EMERGENCY ROOM REPORT
History of Present Illness


General


Chief Complaint:  Altered Mental Status


Source:  EMS


 (Abril Reagan DO)





Present Illness


HPI


Patient presents by paramedics was found in the street essentially difficult to 

arouse


Patient himself does not provide any history upon arrival he opens his eyes and 

looks away


With attempts of obtaining history there was no reports of trauma at the scene 

there was no reports of vomiting or diarrhea


However the history of present illness is significantly limited


 (Abril Reagan DO)


Allergies:  


Coded Allergies:  


     UNABLE TO ASSESS (Unverified , 6/11/20)





COVID-19 Screening


Contact w/high risk pt:  No


Recent Travel to affected area:  No


Experienced COVID-19 symptoms?:  No


COVID-19 Testing performed PTA:  No


 (Abril Reagan DO)





Patient History


Limited by:  medical condition


Past Medical History:  see triage record


Reviewed Nursing Documentation:  PMH: Agreed; PSxH: Agreed (Abril Reagan DO)





Nursing Documentation-PMH


Past Medical History:  Deferred


 (Abril Reagan DO)





Review of Systems


All Other Systems:  limited - Other than the ones mentioned in the history of 

present illness all others are reviewed however they do stay limited due to the 

patient's mental status


 (Abril Reagan DO)





Physical Exam





Vital Signs








  Date Time  Temp Pulse Resp B/P (MAP) Pulse Ox O2 Delivery O2 Flow Rate FiO2


 


6/11/20 11:42 97.7 86 18 125/83 (97) 97 Room Air  








Sp02 EP Interpretation:  reviewed, normal


General Appearance:  no apparent distress


Head:  normocephalic, atraumatic


Eyes:  bilateral eye PERRL, bilateral eye EOMI


ENT:  EOM grossly intact, normal pharynx


Neck:  supple


Respiratory:  lungs clear, no respiratory distress, no retraction


Cardiovascular #1:  regular rate, rhythm


Gastrointestinal:  non tender, soft


Musculoskeletal:  other - Patient does not follow commands however does 

withdraw to physical stimuli and there is no sign of any focal deficit


Neurologic:  other - Responsive to physical stimuli, not verbal, maintaining 

appropriate airway at this time


Skin:  other - Patient appears disheveled


Lymphatic:  no adenopathy


 (Abril Reagan DO)





Medical Decision Making


Diagnostic Impression:  


 Primary Impression:  


 Altered mental status


ER Course


Given the patient's history and presentation multiple differentials are in 

consideration including but not limited to neurological, neurosurgical, 

infectious, metabolic process





Patient is not appropriately responsive


Therefore CT imaging of the brain and baseline blood work is initiated patient 

will require further repeat evaluation





 (Abril Reagan DO)


ER Course


Patient signed out to me pending reassessment.  He presented from the street 

altered.  It was unclear if he was just sleeping and uncooperative or 

intoxicated.  CT scan of the brain did not show any obvious hemorrhage but was 

suboptimal due to motion artifact.  Laboratory studies did not show any 

significant abnormalities.  On reassessment patient was more alert, oriented, 

still being uncooperative but ambulatory.  He was provided food.  He was stable 

for discharge home.  Was discharged to self-care.  At time of discharge she was 

alert oriented ambulatory with steady gait without any neurologic deficits or 

signs of intoxication.


 (Bao Yu M.D.)





Last Vital Signs








  Date Time  Temp Pulse Resp B/P (MAP) Pulse Ox O2 Delivery O2 Flow Rate FiO2


 


6/11/20 11:49  88 18   Room Air  


 


6/11/20 11:49 97.8   120/86 97   








Status:  unchanged


 (Abril Reagan DO)


Status:  improved


 (Bao Yu M.D.)


Disposition:  HOME, SELF-CARE


Condition:  Improved


Referrals:  


NOT CHOSEN IPA/MD,REFERRING (PCP)











Abril Reagan DO Jun 11, 2020 13:35


Bao Yu M.D. Jun 11, 2020 17:58

## 2020-06-11 NOTE — DIAGNOSTIC IMAGING REPORT
Indications: Altered mental status

 

Technique: Spiral acquisitions obtained through the brain. Angled axial and coronal 5

x 5 mm slices were reconstructed. Total dose length product 1232 mGycm.  CTDI vol(s)

53 mGy. Dose reduction achieved using automated exposure control

 

Comparison: None.

 

Findings: Exam is extremely limited bordering on nondiagnostic due to motion

artifact. No gross acute hemorrhage or edema. No gross mass effect nor midline shift.

The visualized white differentiation is grossly normal. There is mild prominence of

the ventricles. There may be some soft tissue swelling in the right supraorbital the

sinuses are clear. The mastoids are clear. The orbits are grossly unremarkable.

Region.

 

Impression: Very limited exam due to motion. No gross acute intracranial bleed or

mass effect

 

 

 

 

 

The CT scanner at [facility] is accredited by the [employer]

[employer]iology and the scans are performed using protocols designed to limit radiation

exposure to as low as reasonably achievable to attain images of sufficient resolution

adequate for diagnostic evaluation.